# Patient Record
Sex: FEMALE | Race: WHITE | NOT HISPANIC OR LATINO | Employment: FULL TIME | URBAN - NONMETROPOLITAN AREA
[De-identification: names, ages, dates, MRNs, and addresses within clinical notes are randomized per-mention and may not be internally consistent; named-entity substitution may affect disease eponyms.]

---

## 2023-02-25 PROBLEM — L30.2 ID REACTION: Status: ACTIVE | Noted: 2023-02-25

## 2023-02-25 PROBLEM — L30.9 ECZEMA: Status: ACTIVE | Noted: 2023-02-25

## 2023-02-25 PROBLEM — R53.83 FATIGUE: Status: ACTIVE | Noted: 2023-02-25

## 2023-02-25 PROBLEM — R09.81 SINUS CONGESTION: Status: ACTIVE | Noted: 2023-02-25

## 2023-02-25 PROBLEM — F41.1 GENERALIZED ANXIETY DISORDER: Status: ACTIVE | Noted: 2023-02-25

## 2023-03-24 ENCOUNTER — OFFICE VISIT (OUTPATIENT)
Dept: PRIMARY CARE | Facility: CLINIC | Age: 28
End: 2023-03-24
Payer: COMMERCIAL

## 2023-03-24 VITALS
BODY MASS INDEX: 38.64 KG/M2 | TEMPERATURE: 97.6 F | DIASTOLIC BLOOD PRESSURE: 84 MMHG | OXYGEN SATURATION: 98 % | WEIGHT: 210 LBS | HEIGHT: 62 IN | HEART RATE: 75 BPM | SYSTOLIC BLOOD PRESSURE: 128 MMHG

## 2023-03-24 DIAGNOSIS — R10.11 COLICKY RIGHT UPPER QUADRANT PAIN: ICD-10-CM

## 2023-03-24 DIAGNOSIS — Z12.4 PAPANICOLAOU SMEAR FOR CERVICAL CANCER SCREENING: ICD-10-CM

## 2023-03-24 DIAGNOSIS — Z00.00 WELLNESS EXAMINATION: Primary | ICD-10-CM

## 2023-03-24 PROCEDURE — 87624 HPV HI-RISK TYP POOLED RSLT: CPT

## 2023-03-24 PROCEDURE — 99395 PREV VISIT EST AGE 18-39: CPT | Performed by: FAMILY MEDICINE

## 2023-03-24 PROCEDURE — 88175 CYTOPATH C/V AUTO FLUID REDO: CPT

## 2023-03-24 PROCEDURE — 1036F TOBACCO NON-USER: CPT | Performed by: FAMILY MEDICINE

## 2023-03-24 ASSESSMENT — PROMIS GLOBAL HEALTH SCALE
RATE_MENTAL_HEALTH: FAIR
CARRYOUT_SOCIAL_ACTIVITIES: VERY GOOD
CARRYOUT_PHYSICAL_ACTIVITIES: COMPLETELY
RATE_SOCIAL_SATISFACTION: VERY GOOD
RATE_AVERAGE_PAIN: 0
RATE_PHYSICAL_HEALTH: GOOD
RATE_GENERAL_HEALTH: GOOD
RATE_QUALITY_OF_LIFE: VERY GOOD
RATE_AVERAGE_FATIGUE: SEVERE
EMOTIONAL_PROBLEMS: SOMETIMES

## 2023-03-24 ASSESSMENT — ENCOUNTER SYMPTOMS
LOSS OF SENSATION IN FEET: 0
OCCASIONAL FEELINGS OF UNSTEADINESS: 0
DEPRESSION: 0

## 2023-03-24 ASSESSMENT — PATIENT HEALTH QUESTIONNAIRE - PHQ9
1. LITTLE INTEREST OR PLEASURE IN DOING THINGS: NOT AT ALL
2. FEELING DOWN, DEPRESSED OR HOPELESS: NOT AT ALL
SUM OF ALL RESPONSES TO PHQ9 QUESTIONS 1 AND 2: 0

## 2023-03-24 NOTE — PROGRESS NOTES
"Subjective   Patient ID: Alison Smith is a 27 y.o. female who presents for Gynecologic Exam (Vision screen: R20/50 L20/10 Bilaterally 20/10/LMP:3/6/23/No HX of abnormal PAP/No HX of pregnancy).    Gynecologic Exam     FEELS WELL MOST DAYS . LOTS OF STRESS LAST FEW MONTHS   JUST LOST HER SISTER WITH LEUKEMIA     Review of Systems   Gastrointestinal:         IBS ISSUES AND DIARRHEA AFTER EATING AND SOME RUQ PAIN    Genitourinary:  Negative for vaginal bleeding.   All other systems reviewed and are negative.      Objective   /84   Pulse 75   Temp 36.4 °C (97.6 °F)   Ht 1.581 m (5' 2.25\")   Wt 95.3 kg (210 lb)   LMP 03/06/2023 (Exact Date)   SpO2 98%   BMI 38.10 kg/m²     Physical Exam  Vitals reviewed.   Constitutional:       Appearance: Normal appearance.   HENT:      Head: Normocephalic.      Mouth/Throat:      Mouth: Mucous membranes are moist.   Eyes:      Pupils: Pupils are equal, round, and reactive to light.   Cardiovascular:      Rate and Rhythm: Normal rate and regular rhythm.      Pulses: Normal pulses.      Heart sounds: Normal heart sounds.   Pulmonary:      Effort: Pulmonary effort is normal.      Breath sounds: Normal breath sounds.   Abdominal:      General: Abdomen is flat.      Palpations: Abdomen is soft. There is no mass.      Tenderness: There is no right CVA tenderness or left CVA tenderness.   Musculoskeletal:         General: Normal range of motion.      Cervical back: Normal range of motion.   Skin:     General: Skin is warm and dry.   Neurological:      General: No focal deficit present.      Mental Status: She is alert.   Psychiatric:         Mood and Affect: Mood normal.         Assessment/Plan   Diagnoses and all orders for this visit:  Wellness examination  -     Follow Up In Primary Care; Future  Papanicolaou smear for cervical cancer screening  -     THINPREP PAP TEST  Colicky right upper quadrant pain  -     US gallbladder; Future         "

## 2023-03-30 LAB
COMPLETE PATHOLOGY REPORT: NORMAL
CONVERTED CLINICAL DIAGNOSIS-HISTORY: NORMAL
CONVERTED DIAGNOSIS COMMENT: NORMAL
CONVERTED FINAL DIAGNOSIS: NORMAL
CONVERTED FINAL REPORT PDF LINK TO COPY AND PASTE: NORMAL

## 2023-04-03 ENCOUNTER — TELEPHONE (OUTPATIENT)
Dept: PRIMARY CARE | Facility: CLINIC | Age: 28
End: 2023-04-03
Payer: COMMERCIAL

## 2023-04-10 NOTE — TELEPHONE ENCOUNTER
Liana Zabala, DO Dandre Deleon, APRN-CNP5 days ago     PL  She is HPV + Follow at least one year but her  Age makes we want to refer for colposcopy

## 2023-04-10 NOTE — TELEPHONE ENCOUNTER
Patient called and notified of the results below. I dont see any messages other than mine. Is she to wait one year for repeat pap or get scheduled now for colposcopy?

## 2023-04-10 NOTE — TELEPHONE ENCOUNTER
Liana Zabala, DO  [facility] now (4:39 PM)     PL  NORMAL PAP AND YEARLY EXAM TO FOLLOW HPV WHICH SHOULD CLEAR ON ITS OWN

## 2023-04-28 ENCOUNTER — TELEPHONE (OUTPATIENT)
Dept: PRIMARY CARE | Facility: CLINIC | Age: 28
End: 2023-04-28
Payer: COMMERCIAL

## 2023-04-28 NOTE — TELEPHONE ENCOUNTER
----- Message from Liana Zabala DO sent at 4/28/2023  9:11 AM EDT -----  YOUR GB STUDY IS REPORTED NORMAL/CONTINUE LOWER FAT AND MORE VEGETABLES   FOLLOW IF THIS IS STILL A PROBLEM

## 2023-04-28 NOTE — TELEPHONE ENCOUNTER
Spoke with Alison.  She is aware of both Gallbladder US and PAP.  Will follow if GB continues to be a problem, and will follow in 1 yr regarding her PAP.  CODY ROA

## 2023-05-30 LAB
ERYTHROCYTE DISTRIBUTION WIDTH (RATIO) BY AUTOMATED COUNT: 13.5 % (ref 11.5–14.5)
ERYTHROCYTE MEAN CORPUSCULAR HEMOGLOBIN CONCENTRATION (G/DL) BY AUTOMATED: 31.4 G/DL (ref 32–36)
ERYTHROCYTE MEAN CORPUSCULAR VOLUME (FL) BY AUTOMATED COUNT: 88 FL (ref 80–100)
ERYTHROCYTES (10*6/UL) IN BLOOD BY AUTOMATED COUNT: 4.47 X10E12/L (ref 4–5.2)
HEMATOCRIT (%) IN BLOOD BY AUTOMATED COUNT: 39.5 % (ref 36–46)
HEMOGLOBIN (G/DL) IN BLOOD: 12.4 G/DL (ref 12–16)
LEUKOCYTES (10*3/UL) IN BLOOD BY AUTOMATED COUNT: 10 X10E9/L (ref 4.4–11.3)
PLATELETS (10*3/UL) IN BLOOD AUTOMATED COUNT: 276 X10E9/L (ref 150–450)
REFLEX ADDED, ANEMIA PANEL: ABNORMAL

## 2023-05-31 LAB
ABO GROUP (TYPE) IN BLOOD: NORMAL
ANTIBODY SCREEN: NORMAL
HEPATITIS B VIRUS SURFACE AG PRESENCE IN SERUM: NONREACTIVE
HEPATITIS C VIRUS AB PRESENCE IN SERUM: NONREACTIVE
HIV 1/ 2 AG/AB SCREEN: NONREACTIVE
RH FACTOR: NORMAL
RUBELLA VIRUS IGG AB: POSITIVE
SYPHILIS TOTAL AB: NONREACTIVE

## 2023-06-17 LAB
CHLAMYDIA TRACH., AMPLIFIED: NEGATIVE
N. GONORRHEA, AMPLIFIED: NEGATIVE
URINE CULTURE: NORMAL

## 2023-06-24 LAB
ESTIMATED AVERAGE GLUCOSE FOR HBA1C: 108 MG/DL
HEMOGLOBIN A1C/HEMOGLOBIN TOTAL IN BLOOD: 5.4 %

## 2023-09-01 ENCOUNTER — HOSPITAL ENCOUNTER (OUTPATIENT)
Dept: DATA CONVERSION | Facility: HOSPITAL | Age: 28
End: 2023-09-01
Attending: OBSTETRICS & GYNECOLOGY
Payer: COMMERCIAL

## 2023-09-01 DIAGNOSIS — Z3A.20 20 WEEKS GESTATION OF PREGNANCY (HHS-HCC): ICD-10-CM

## 2023-09-01 DIAGNOSIS — O36.8120 DECREASED FETAL MOVEMENTS, SECOND TRIMESTER, NOT APPLICABLE OR UNSPECIFIED (HHS-HCC): ICD-10-CM

## 2023-09-26 VITALS — SYSTOLIC BLOOD PRESSURE: 128 MMHG | BODY MASS INDEX: 41.19 KG/M2 | WEIGHT: 218 LBS | DIASTOLIC BLOOD PRESSURE: 70 MMHG

## 2023-09-26 PROBLEM — O99.210 OBESITY IN PREGNANCY (HHS-HCC): Status: ACTIVE | Noted: 2023-09-26

## 2023-09-26 PROBLEM — O21.9 NAUSEA AND VOMITING IN PREGNANCY (HHS-HCC): Status: ACTIVE | Noted: 2023-09-26

## 2023-09-26 PROBLEM — O35.9XX0 SUSPECTED FETAL ANOMALY, ANTEPARTUM (HHS-HCC): Status: ACTIVE | Noted: 2023-09-26

## 2023-09-26 RX ORDER — METOCLOPRAMIDE 10 MG/1
TABLET ORAL EVERY 6 HOURS
COMMUNITY
Start: 2023-07-13 | End: 2023-12-26 | Stop reason: HOSPADM

## 2023-09-26 RX ORDER — NEOMYCIN/BACITRACIN/POLYMYXINB 3.5-400-5K
OINTMENT (GRAM) TOPICAL
COMMUNITY
End: 2023-12-26 | Stop reason: HOSPADM

## 2023-09-26 NOTE — PROGRESS NOTES
STILL HAVING NAUSEA, NOT AS BAD AS LAST TIME. YESTERDAY PT GOT SLIGHTLY DIZZY, SHE WAS SNACKING ALL DAY LIKE RECOMMENDED. WOULD LIKE FIANCE IN ROOM FOR ULTRASOUND. NO BLEEDING OR CRAMPING -Nevaeh Chaney RN 06/16/2023 11:41 AM    ENOB: Patient doing well. Denies bleeding, pain, n/v  ROS: Neg fever, chills, cp/sob, abdominal pain  Exam as above and exam tab  A/P:  Oriented to practice  Pap NIL/HPV+ earlier this week, NG/CT done today  Discussed genetic screening, patient consents to F[facility]ight/Prequel  Advised COVID, Flu, TDap during pregnancy  A1c ordered for BMI  PNV  Precautions given -Cynthia Delgado 06/16/2023 12:24 PM

## 2023-09-30 NOTE — DISCHARGE SUMMARY
Send Summary:   Discharge Summary Providers:  Provider Role Provider Name   · Attending Cynthia Delgado   · Referring Cynthia Delgado   · Primary Liana Zabala       Note Recipients: Liana ZabalaDO - [id number]  [Preferred]       Discharge:    Summary:   Admission Date: .01-Sep-2023 16:51:00   Discharge Date: 01-Sep-2023   Attending Physician at Discharge: Cynthia Delgado   Admission Reason: Decreased fetal movement   Final Discharge Diagnoses: Same   Procedures: None   Condition at Discharge: Satisfactory   Disposition at Discharge: .Home   Vital Signs:        T   P  R  BP   MAP  SpO2   Value     80     139/77   101     Date/Time   9/1 17:02   9/1 17:02  9/1 17:02    Range     (80 - 80 )    (139 - 139 )/ (77 - 77 )  (101 - 101 )    Hospital Course:    G1 presenting at 20 weeks with complaint of decreased fetal movement. During monitoring FHT was found to be in normal range and fluid normal with fetal movement noted  on BSUS. Patient was discharged home with precautions and plan for short interval follow up.      Discharge Information:    and Continuing Care:   Lab Results - Pending:    None  Radiology Results - Pending: None   [place] Suicide Risk: negative   Discharge Instructions:    Activity:           Return to normal activity as tolerated    Nutrition/Diet:           Regular    Follow Up Appointments:    Follow-Up - OB Provider:           Physician/Dept/Service:   OB Provider   GWS          Call to Schedule in:   2 weeks          Location:   [facility]          Phone Number:   [phone]    Discharge Medications: Home Medication   albuterol 90 mcg/inh inhalation aerosol - 2 puff(s) inhaled 4 times a day as needed  PNV Prenatal oral tablet - 1 tab(s) orally once a day  Unisom 25 mg oral tablet - 1 tab(s) orally once a day     PRN Medication   metoclopramide 10 mg oral tablet - 1 tab(s) orally 4 times a day (before meals and at bedtime), As Needed     DNR Status:   ·  Code  Status Code Status order at time of discharge: Full Code       Electronic Signatures:  Cynthia Delgado ()  (Signed 01-Sep-2023 19:20)   Authored: Send Summary, Summary Content, Ongoing Care,  DNR Status, Note Completion      Last Updated: 01-Sep-2023 19:20 by Cynthia Delgado (DO)

## 2023-09-30 NOTE — PROGRESS NOTES
Current Stage:   Stage: Antepartum     Subjective Data:   Antepartum:  Vaginal Bleeding: No   Contractions/Abdominal Pain: No   Discharge/Loss of Fluid: No   Fetal Movement: Decreased   Antepartum:    Patient is a  who presents at 20 weeks for complaint of decreased fetal movement. She denies pain and bleeding. She denies leaking fluid. She states she has started  feeling fetal movement in the last 1-2 weeks. She states that this week she has felt movement every day. Last night she attempted to listen to the fetal heartbeat with a home doppler and was unable to find the heartbeat. She states today she has not felt  any movement and she is worried.      Objective Information:    Objective Information:      T   P  R  BP   MAP  SpO2   Value     80     139/77   101     Date/Time    17:02    17:02   17:02    Range     (80 - 80 )    (139 - 139 )/ (77 - 77 )  (101 - 101 )        Physical Exam:   Constitutional: alert, oriented   Respiratory/Thorax: normal respiratory effort   Cardiovascular: Regular rate   Gastrointestinal: Soft, NT, gravid   Extremities: No edema   Psychological: Affect/mood appropriate   Skin: no rashes or lesions     Radiology Results:    Results:    BSUS: MVP 4.03 cm, otherwise grossly normal. +FCA/FM     Testing:   NST Interpretation - Baby A:  ·  Baseline      Assessment and Plan:   Assessment:     presenting for decreased fetal movement at 20 weeks  Discussed findings on exam  Reassured patient FHT is in the casi range, fluid is normal, and fetal movement was seen during US  Discussed that it is not unusual for patients to have irregular movement at 20 weeks  Patient has anatomy US next week and follow up in office on 23. Advised to keep both visits  Precautions given      Electronic Signatures:  Cynthia Delgado ()  (Signed 01-Sep-2023 19:15)   Authored: Current Stage, Subjective Data, Objective Data,   Testing, Assessment and Plan, Note  Completion      Last Updated: 01-Sep-2023 19:15 by Cynthia Delgado (DO)

## 2023-10-09 ENCOUNTER — ROUTINE PRENATAL (OUTPATIENT)
Dept: OBSTETRICS AND GYNECOLOGY | Facility: CLINIC | Age: 28
End: 2023-10-09
Payer: COMMERCIAL

## 2023-10-09 VITALS — DIASTOLIC BLOOD PRESSURE: 72 MMHG | SYSTOLIC BLOOD PRESSURE: 120 MMHG | BODY MASS INDEX: 41.95 KG/M2 | WEIGHT: 222 LBS

## 2023-10-09 DIAGNOSIS — Z13.79 GENETIC SCREENING: ICD-10-CM

## 2023-10-09 DIAGNOSIS — R10.30: ICD-10-CM

## 2023-10-09 DIAGNOSIS — O99.340 ANXIETY DURING PREGNANCY (HHS-HCC): ICD-10-CM

## 2023-10-09 DIAGNOSIS — F41.9 ANXIETY DURING PREGNANCY (HHS-HCC): ICD-10-CM

## 2023-10-09 DIAGNOSIS — O26.899: ICD-10-CM

## 2023-10-09 DIAGNOSIS — Z3A.26 26 WEEKS GESTATION OF PREGNANCY (HHS-HCC): Primary | ICD-10-CM

## 2023-10-09 DIAGNOSIS — O21.9 NAUSEA AND VOMITING IN PREGNANCY (HHS-HCC): ICD-10-CM

## 2023-10-09 DIAGNOSIS — Z88.0 PENICILLIN ALLERGY: ICD-10-CM

## 2023-10-09 LAB
POC APPEARANCE, URINE: CLEAR
POC BILIRUBIN, URINE: NEGATIVE
POC BLOOD, URINE: NEGATIVE
POC COLOR, URINE: YELLOW
POC GLUCOSE, URINE: NEGATIVE MG/DL
POC KETONES, URINE: NEGATIVE MG/DL
POC LEUKOCYTES, URINE: NEGATIVE
POC NITRITE,URINE: NEGATIVE
POC PH, URINE: 5 PH
POC PROTEIN, URINE: NEGATIVE MG/DL
POC SPECIFIC GRAVITY, URINE: <=1.005
POC UROBILINOGEN, URINE: 0.2 EU/DL

## 2023-10-09 PROCEDURE — 81003 URINALYSIS AUTO W/O SCOPE: CPT | Performed by: OBSTETRICS & GYNECOLOGY

## 2023-10-09 PROCEDURE — 0501F PRENATAL FLOW SHEET: CPT | Performed by: OBSTETRICS & GYNECOLOGY

## 2023-10-09 NOTE — PROGRESS NOTES
"Subjective   Patient ID [id number]   Alison Smith is a 28 y.o.  at 26w1d with a working estimated date of delivery of 2024, by Ultrasound who presents for a routine prenatal visit. She denies vaginal bleeding, leakage of fluid, decreased fetal movements, or contractions.    Endorses N/V, pelvic cramps. See problem list    Objective   Physical Exam  Weight: 101 kg (222 lb)  Expected Total Weight Gain: 5 kg (11 lb)-9 kg (19 lb)   Pregravid BMI: 40.27  BP: 120/72  Fetal Heart Rate: 147 Fundal Height (cm): 28 cm    Prenatal Labs  Urine dip:  Lab Results   Component Value Date    KETONESU NEGATIVE 10/09/2023       Lab Results   Component Value Date    HGB 12.4 2023    HCT 39.5 2023    HEPBSAG NONREACTIVE 2023     No results found for: \"PAPPA\", \"AFP\", \"HCG\", \"ESTRIOL\", \"INHBA\"  No results found for: \"GLUF\", \"GLUT1\", \"DSGVUFB2UF\", \"JGGRYRD4MV\"    Imaging  The most recent ultrasound was performed on The most recent ultrasound study is not finalized with a study GA of The most recent ultrasound study is not finalized and EFW of The most recent ultrasound study is not finalized.  The most recent ultrasound study is not finalized  The most recent ultrasound study is not finalized    Assessment/Plan   Problem List Items Addressed This Visit             ICD-10-CM    Nausea and vomiting in pregnancy O21.9    26 weeks gestation of pregnancy - Primary Z3A.26     CBC, GCT          Relevant Orders    POC Urine Dip (Completed)    Penicillin allergy Z88.0     hives         Anxiety during pregnancy O99.340, F41.9     No meds         Genetic screening Z13.79     Risk reducing NIPT 46XY         Pregnancy with abdominal cramping of lower quadrant, antepartum O26.899, R10.30     Recent pelvic cramping. Mild.   N/V and very thirsty. No UTI sx. No abnormal discharge.   SVE closed.             Follow up in 2 weeks for a routine prenatal visit.  "

## 2023-10-09 NOTE — ASSESSMENT & PLAN NOTE
Prepreg BMI 40. Early HbA1c 5.4%  For growth US at 30, 36 wks. 9/2023 EFW 61%. Posterior placenta. Anatomy US complete after second US.   For NST or BPP from 34 wks to delivery

## 2023-10-09 NOTE — ASSESSMENT & PLAN NOTE
Recent pelvic cramping. Mild.   N/V and very thirsty. No UTI sx. No abnormal discharge.   SVE closed.

## 2023-10-13 ENCOUNTER — LAB (OUTPATIENT)
Dept: LAB | Facility: LAB | Age: 28
End: 2023-10-13
Payer: COMMERCIAL

## 2023-10-13 DIAGNOSIS — Z34.90 ENCOUNTER FOR SUPERVISION OF NORMAL PREGNANCY, UNSPECIFIED, UNSPECIFIED TRIMESTER (HHS-HCC): Primary | ICD-10-CM

## 2023-10-13 LAB
ERYTHROCYTE [DISTWIDTH] IN BLOOD BY AUTOMATED COUNT: 13.4 % (ref 11.5–14.5)
GLUCOSE 1H P 50 G GLC PO SERPL-MCNC: 155 MG/DL
HCT VFR BLD AUTO: 35.2 % (ref 36–46)
HGB BLD-MCNC: 11.4 G/DL (ref 12–16)
MCH RBC QN AUTO: 28.6 PG (ref 26–34)
MCHC RBC AUTO-ENTMCNC: 32.4 G/DL (ref 32–36)
MCV RBC AUTO: 88 FL (ref 80–100)
NRBC BLD-RTO: 0 /100 WBCS (ref 0–0)
PLATELET # BLD AUTO: 236 X10*3/UL (ref 150–450)
PMV BLD AUTO: 10 FL (ref 7.5–11.5)
RBC # BLD AUTO: 3.98 X10*6/UL (ref 4–5.2)
REFLEX ADDED, ANEMIA PANEL: NORMAL
WBC # BLD AUTO: 11.4 X10*3/UL (ref 4.4–11.3)

## 2023-10-13 PROCEDURE — 85027 COMPLETE CBC AUTOMATED: CPT

## 2023-10-13 PROCEDURE — 82947 ASSAY GLUCOSE BLOOD QUANT: CPT

## 2023-10-13 PROCEDURE — 36415 COLL VENOUS BLD VENIPUNCTURE: CPT

## 2023-10-17 DIAGNOSIS — R73.09 GLUCOSE TOLERANCE TEST ABNORMAL: ICD-10-CM

## 2023-10-21 ENCOUNTER — LAB (OUTPATIENT)
Dept: LAB | Facility: LAB | Age: 28
End: 2023-10-21
Payer: COMMERCIAL

## 2023-10-21 DIAGNOSIS — R73.09 GLUCOSE TOLERANCE TEST ABNORMAL: ICD-10-CM

## 2023-10-21 LAB
GLUCOSE 1H P 100 G GLC PO SERPL-MCNC: 167 MG/DL
GLUCOSE 2H P 100 G GLC PO SERPL-MCNC: 115 MG/DL
GLUCOSE 3H P 100 G GLC PO SERPL-MCNC: 131 MG/DL
GLUCOSE P FAST SERPL-MCNC: 89 MG/DL

## 2023-10-21 PROCEDURE — 82951 GLUCOSE TOLERANCE TEST (GTT): CPT

## 2023-10-21 PROCEDURE — 36415 COLL VENOUS BLD VENIPUNCTURE: CPT

## 2023-10-21 PROCEDURE — 82950 GLUCOSE TEST: CPT

## 2023-10-21 PROCEDURE — 82952 GTT-ADDED SAMPLES: CPT

## 2023-10-23 ENCOUNTER — OFFICE VISIT (OUTPATIENT)
Dept: OBSTETRICS AND GYNECOLOGY | Facility: CLINIC | Age: 28
End: 2023-10-23
Payer: COMMERCIAL

## 2023-10-23 VITALS — WEIGHT: 223 LBS | DIASTOLIC BLOOD PRESSURE: 68 MMHG | BODY MASS INDEX: 42.14 KG/M2 | SYSTOLIC BLOOD PRESSURE: 112 MMHG

## 2023-10-23 DIAGNOSIS — O99.210 OBESITY IN PREGNANCY (HHS-HCC): ICD-10-CM

## 2023-10-23 DIAGNOSIS — Z34.03 ENCOUNTER FOR SUPERVISION OF NORMAL FIRST PREGNANCY IN THIRD TRIMESTER (HHS-HCC): Primary | ICD-10-CM

## 2023-10-23 LAB
POC APPEARANCE, URINE: CLEAR
POC BILIRUBIN, URINE: NEGATIVE
POC BLOOD, URINE: NEGATIVE
POC COLOR, URINE: YELLOW
POC GLUCOSE, URINE: NEGATIVE MG/DL
POC KETONES, URINE: NEGATIVE MG/DL
POC LEUKOCYTES, URINE: NEGATIVE
POC NITRITE,URINE: NEGATIVE
POC PH, URINE: 6.5 PH
POC PROTEIN, URINE: NEGATIVE MG/DL
POC SPECIFIC GRAVITY, URINE: <=1.005
POC UROBILINOGEN, URINE: 0.2 EU/DL

## 2023-10-23 PROCEDURE — 1036F TOBACCO NON-USER: CPT | Performed by: NURSE PRACTITIONER

## 2023-10-23 PROCEDURE — 81003 URINALYSIS AUTO W/O SCOPE: CPT | Performed by: NURSE PRACTITIONER

## 2023-10-23 PROCEDURE — 99213 OFFICE O/P EST LOW 20 MIN: CPT | Performed by: NURSE PRACTITIONER

## 2023-10-23 NOTE — PROGRESS NOTES
Alison Smith is a 28 y.o.  who presents at 28w1d with Estimated Date of Delivery: 24 for a routine prenatal visit.   Patient doing well. Baby is active.    Her pregnancy is complicated by:  Abnormal glucose tolerance: Failed 1-hr, passed 3-hr GTT.   Obesity in pregnancy: Prepreg BMI 40. Plan weekly NSTs at 34 weeks. Has growth US scheduled at 30 weeks.     Objective   Fetal Heart Rate: 140  Fundal Height (cm): 28 cm  Movement: Present  BP: 112/68  Weight  Weight: 101 kg (223 lb)  Urine Dipstick  Urine Protein: NEGATIVE  Urine Leukocytes: NEGATIVE  Urine Ketone: NEGATIVE  Urine Glucose: NEGATIVE      Assessment/Plan   Problem List Items Addressed This Visit             ICD-10-CM       Ob-Gyn Problems    Obesity in pregnancy O99.210     Other Visit Diagnoses         Codes    Encounter for supervision of normal first pregnancy in third trimester    -  Primary Z34.03    Relevant Orders    POC Urine Dip (Completed)          Continue prenatal vitamin.  Follow up in 2 weeks for a routine prenatal visit.  
Refused

## 2023-11-06 ENCOUNTER — APPOINTMENT (OUTPATIENT)
Dept: RADIOLOGY | Facility: CLINIC | Age: 28
End: 2023-11-06
Payer: COMMERCIAL

## 2023-11-06 ENCOUNTER — ANCILLARY PROCEDURE (OUTPATIENT)
Dept: RADIOLOGY | Facility: CLINIC | Age: 28
End: 2023-11-06
Payer: COMMERCIAL

## 2023-11-06 DIAGNOSIS — Z3A.30 30 WEEKS GESTATION OF PREGNANCY (HHS-HCC): ICD-10-CM

## 2023-11-06 PROCEDURE — 76816 OB US FOLLOW-UP PER FETUS: CPT

## 2023-11-06 PROCEDURE — 76819 FETAL BIOPHYS PROFIL W/O NST: CPT

## 2023-11-06 PROCEDURE — 76816 OB US FOLLOW-UP PER FETUS: CPT | Performed by: OBSTETRICS & GYNECOLOGY

## 2023-11-06 PROCEDURE — 76819 FETAL BIOPHYS PROFIL W/O NST: CPT | Performed by: OBSTETRICS & GYNECOLOGY

## 2023-11-07 ENCOUNTER — ROUTINE PRENATAL (OUTPATIENT)
Dept: OBSTETRICS AND GYNECOLOGY | Facility: CLINIC | Age: 28
End: 2023-11-07
Payer: COMMERCIAL

## 2023-11-07 VITALS — WEIGHT: 225 LBS | BODY MASS INDEX: 42.51 KG/M2 | SYSTOLIC BLOOD PRESSURE: 112 MMHG | DIASTOLIC BLOOD PRESSURE: 68 MMHG

## 2023-11-07 DIAGNOSIS — Z34.93 THIRD TRIMESTER PREGNANCY (HHS-HCC): Primary | ICD-10-CM

## 2023-11-07 LAB
POC APPEARANCE, URINE: CLEAR
POC BILIRUBIN, URINE: NEGATIVE
POC BLOOD, URINE: NEGATIVE
POC COLOR, URINE: YELLOW
POC GLUCOSE, URINE: NEGATIVE MG/DL
POC KETONES, URINE: NEGATIVE MG/DL
POC LEUKOCYTES, URINE: NEGATIVE
POC NITRITE,URINE: NEGATIVE
POC PH, URINE: 7 PH
POC PROTEIN, URINE: NEGATIVE MG/DL
POC SPECIFIC GRAVITY, URINE: 1.01
POC UROBILINOGEN, URINE: 0.2 EU/DL

## 2023-11-07 PROCEDURE — 81003 URINALYSIS AUTO W/O SCOPE: CPT | Performed by: OBSTETRICS & GYNECOLOGY

## 2023-11-07 PROCEDURE — 0501F PRENATAL FLOW SHEET: CPT | Performed by: OBSTETRICS & GYNECOLOGY

## 2023-11-07 NOTE — PATIENT INSTRUCTIONS
You were seen in the office today for routine OB care and had normal findings on exam  Continue routine OB precautions at home  Continue taking prenatal vitamins   Avoid sick contacts and consider getting your Flu (available in office during flu season) and COVID vaccines to protect against infection in pregnancy  We recommend getting the Tdap (available in office) and RSV vaccines to pass some immunity from mother to baby and protect your baby against RSV and Whooping cough in the first few months of life. Tdap can be given between 27 and 36 weeks, and RSV vaccinations can be given between 32 and 36 weeks gestation  Make an appointment for routine care in the office in the next 2 weeks  If you are having any painful contractions, vaginal bleeding, leaking amniotic fluid, or decrease in baby's movements prior to your next visit please call the office to speak to the physician on call. This includes after hours, weekends, and holidays, when the answering service will be able to connect you with the physician on call. [phone] ([place] Office) or [phone] ([place] Office).

## 2023-11-07 NOTE — PROGRESS NOTES
Subjective   Patient ID [id number]   Alison Smith is a 28 y.o.  at 30w2d with a working estimated date of delivery of 2024, by Ultrasound who presents for a routine prenatal visit. She denies vaginal bleeding, leakage of fluid, decreased fetal movements, or contractions.    Her pregnancy is complicated by:  Failed 1 hour GTT, passed 3 hour GTT  Obesity  PCN allergy    Objective   Physical Exam:   Weight: 102 kg (225 lb)  Expected Total Weight Gain: 5 kg (11 lb)-9 kg (19 lb)   Pregravid BMI: 40.27  BP: 112/68                  Prenatal Labs  Urine Dip:  Lab Results   Component Value Date    KETONESU NEGATIVE 10/23/2023     Lab Results   Component Value Date    HGB 11.4 (L) 10/13/2023    HCT 35.2 (L) 10/13/2023    ABO A 2023    HEPBSAG NONREACTIVE 2023       Imagin g (53%) normal interval growth. BPP     Assessment/Plan   Problem List Items Addressed This Visit    None  Visit Diagnoses         Codes    Third trimester pregnancy    -  Primary Z34.93    Patient doing well  Second trimester labs reviewed/normal  Encouraged TDap/RSV  RTO 2 weeks     Relevant Orders    POC Urine Dip (Completed)          Continue prenatal vitamin.  Labs reviewed.  GBS at 36  Expected mode of delivery TBD  Follow up in 2 week for a routine prenatal visit.  Cynthia Delgado DO

## 2023-11-20 ENCOUNTER — ROUTINE PRENATAL (OUTPATIENT)
Dept: OBSTETRICS AND GYNECOLOGY | Facility: CLINIC | Age: 28
End: 2023-11-20
Payer: COMMERCIAL

## 2023-11-20 VITALS — WEIGHT: 226 LBS | DIASTOLIC BLOOD PRESSURE: 70 MMHG | SYSTOLIC BLOOD PRESSURE: 118 MMHG | BODY MASS INDEX: 42.7 KG/M2

## 2023-11-20 DIAGNOSIS — Z34.03 PRENATAL CARE, FIRST PREGNANCY IN THIRD TRIMESTER (HHS-HCC): Primary | ICD-10-CM

## 2023-11-20 DIAGNOSIS — Z23 NEED FOR DTP VACCINE: ICD-10-CM

## 2023-11-20 DIAGNOSIS — Z3A.32 32 WEEKS GESTATION OF PREGNANCY (HHS-HCC): ICD-10-CM

## 2023-11-20 LAB
POC GLUCOSE, URINE: NEGATIVE MG/DL
POC PROTEIN, URINE: ABNORMAL MG/DL

## 2023-11-20 PROCEDURE — 0501F PRENATAL FLOW SHEET: CPT | Performed by: OBSTETRICS & GYNECOLOGY

## 2023-11-20 PROCEDURE — 90715 TDAP VACCINE 7 YRS/> IM: CPT | Performed by: OBSTETRICS & GYNECOLOGY

## 2023-11-20 PROCEDURE — 90471 IMMUNIZATION ADMIN: CPT | Performed by: OBSTETRICS & GYNECOLOGY

## 2023-11-20 PROCEDURE — 81003 URINALYSIS AUTO W/O SCOPE: CPT | Performed by: OBSTETRICS & GYNECOLOGY

## 2023-11-20 NOTE — PROGRESS NOTES
Prenatal Visit    Patient presents for routine prenatal visit.   Feeling well.  Baby is moving. No abdominal pain. No bleeding. No leaking fluid.    Objective   Physical Exam:   Weight: 103 kg (226 lb)  Expected Total Weight Gain: 5 kg (11 lb)-9 kg (19 lb)   Pregravid BMI: 40.27  BP: 118/70  Fetal Heart Rate: 140 Fundal Height (cm): 33 cm             Assessment/Plan   1. Prenatal care, first pregnancy in third trimester    2. 32 weeks gestation of pregnancy  - POC Urine Dip  - Tdap vaccine, age 7 years and older (ADACEL)    3. Need for DTP vaccine [Z23]    Continue prenatal vitamins  Precautions given. Advised her to call for any concerns.  Tdap vaccine recommended; she will get this today.  Start weekly NSTs at her next visit.  Follow up in 2 weeks.

## 2023-11-22 ENCOUNTER — TELEPHONE (OUTPATIENT)
Dept: OBSTETRICS AND GYNECOLOGY | Facility: CLINIC | Age: 28
End: 2023-11-22
Payer: COMMERCIAL

## 2023-11-22 NOTE — TELEPHONE ENCOUNTER
11/22/23  PT HAS AN NST COMING UP ON 12/11/23 W/DR. JAMES. PT IS 32.7 WKS AND HAS HAD CRAMPS FOR THE PAST 2 DAYS, LIKE A PERIOD, MAINLY ON THE RIGHT SIDE. PT WOULD LIKE A CALL BACK.     PT'S# [phone]    PP

## 2023-12-04 ENCOUNTER — PROCEDURE VISIT (OUTPATIENT)
Dept: OBSTETRICS AND GYNECOLOGY | Facility: CLINIC | Age: 28
End: 2023-12-04
Payer: COMMERCIAL

## 2023-12-04 ENCOUNTER — APPOINTMENT (OUTPATIENT)
Dept: OBSTETRICS AND GYNECOLOGY | Facility: CLINIC | Age: 28
End: 2023-12-04
Payer: COMMERCIAL

## 2023-12-04 VITALS — WEIGHT: 233 LBS | SYSTOLIC BLOOD PRESSURE: 110 MMHG | BODY MASS INDEX: 44.02 KG/M2 | DIASTOLIC BLOOD PRESSURE: 70 MMHG

## 2023-12-04 DIAGNOSIS — O99.210 OBESITY IN PREGNANCY (HHS-HCC): ICD-10-CM

## 2023-12-04 DIAGNOSIS — Z3A.34 34 WEEKS GESTATION OF PREGNANCY (HHS-HCC): Primary | ICD-10-CM

## 2023-12-04 PROCEDURE — 81003 URINALYSIS AUTO W/O SCOPE: CPT | Performed by: NURSE PRACTITIONER

## 2023-12-04 PROCEDURE — 59025 FETAL NON-STRESS TEST: CPT | Performed by: NURSE PRACTITIONER

## 2023-12-04 PROCEDURE — 99213 OFFICE O/P EST LOW 20 MIN: CPT | Performed by: NURSE PRACTITIONER

## 2023-12-04 RX ORDER — FERROUS SULFATE 325(65) MG
325 TABLET ORAL 2 TIMES WEEKLY
COMMUNITY

## 2023-12-04 NOTE — PROGRESS NOTES
Alison Smith is a 28 y.o.  who presents at 34w1d with Estimated Date of Delivery: 24 for a routine prenatal visit.   She is feeling well. Baby is active.  Labor plans discussed, considering epidural. Has not selected pediatrician yet, list provided. Planning on pumping and feeding.     Her pregnancy is complicated by:  Obesity: Pregravid BMI 40.27. Weekly NSTs. Growth US scheduled at 36 weeks.     Objective   Fetal Heart Rate: 140  Movement: Present  BP: 110/70  Weight  Weight: 106 kg (233 lb)  Urine Dipstick  Urine Protein: NEGATIVE  Urine Leukocytes: NEGATIVE  Urine Ketone: NEGATIVE  Urine Glucose: NEGATIVE     NST reactive, category 1     Assessment/Plan   Diagnoses and all orders for this visit:  34 weeks gestation of pregnancy  -     POC Urine Dip  Obesity in pregnancy  -     Fetal nonstress test; Future  Weekly NSTs  Growth US 36 weeks  Precautions given  Follow up 1 week, sooner if needed

## 2023-12-11 ENCOUNTER — PROCEDURE VISIT (OUTPATIENT)
Dept: OBSTETRICS AND GYNECOLOGY | Facility: CLINIC | Age: 28
End: 2023-12-11
Payer: COMMERCIAL

## 2023-12-11 ENCOUNTER — LAB (OUTPATIENT)
Dept: LAB | Facility: LAB | Age: 28
End: 2023-12-11
Payer: COMMERCIAL

## 2023-12-11 VITALS — DIASTOLIC BLOOD PRESSURE: 72 MMHG | SYSTOLIC BLOOD PRESSURE: 138 MMHG | BODY MASS INDEX: 44.02 KG/M2 | WEIGHT: 233 LBS

## 2023-12-11 DIAGNOSIS — O99.210 OBESITY IN PREGNANCY (HHS-HCC): ICD-10-CM

## 2023-12-11 DIAGNOSIS — O16.9 ELEVATED BLOOD PRESSURE AFFECTING PREGNANCY, ANTEPARTUM (HHS-HCC): ICD-10-CM

## 2023-12-11 DIAGNOSIS — Z3A.35 35 WEEKS GESTATION OF PREGNANCY (HHS-HCC): ICD-10-CM

## 2023-12-11 DIAGNOSIS — Z34.03 PRENATAL CARE, FIRST PREGNANCY IN THIRD TRIMESTER (HHS-HCC): Primary | ICD-10-CM

## 2023-12-11 LAB
ALT SERPL W P-5'-P-CCNC: 9 U/L (ref 7–45)
AST SERPL W P-5'-P-CCNC: 12 U/L (ref 9–39)
CREAT SERPL-MCNC: 0.55 MG/DL (ref 0.5–1.05)
ERYTHROCYTE [DISTWIDTH] IN BLOOD BY AUTOMATED COUNT: 13.4 % (ref 11.5–14.5)
GFR SERPL CREATININE-BSD FRML MDRD: >90 ML/MIN/1.73M*2
HCT VFR BLD AUTO: 34.5 % (ref 36–46)
HGB BLD-MCNC: 11.2 G/DL (ref 12–16)
LDH SERPL L TO P-CCNC: 140 U/L (ref 84–246)
MCH RBC QN AUTO: 28.1 PG (ref 26–34)
MCHC RBC AUTO-ENTMCNC: 32.5 G/DL (ref 32–36)
MCV RBC AUTO: 87 FL (ref 80–100)
NRBC BLD-RTO: 0 /100 WBCS (ref 0–0)
PLATELET # BLD AUTO: 254 X10*3/UL (ref 150–450)
POC GLUCOSE, URINE: NEGATIVE MG/DL
POC PROTEIN, URINE: ABNORMAL MG/DL
RBC # BLD AUTO: 3.98 X10*6/UL (ref 4–5.2)
URATE SERPL-MCNC: 2.2 MG/DL (ref 2.3–6.7)
WBC # BLD AUTO: 12.2 X10*3/UL (ref 4.4–11.3)

## 2023-12-11 PROCEDURE — 99213 OFFICE O/P EST LOW 20 MIN: CPT | Performed by: OBSTETRICS & GYNECOLOGY

## 2023-12-11 PROCEDURE — 84156 ASSAY OF PROTEIN URINE: CPT

## 2023-12-11 PROCEDURE — 84450 TRANSFERASE (AST) (SGOT): CPT

## 2023-12-11 PROCEDURE — 81003 URINALYSIS AUTO W/O SCOPE: CPT | Performed by: OBSTETRICS & GYNECOLOGY

## 2023-12-11 PROCEDURE — 83615 LACTATE (LD) (LDH) ENZYME: CPT

## 2023-12-11 PROCEDURE — 84460 ALANINE AMINO (ALT) (SGPT): CPT

## 2023-12-11 PROCEDURE — 82570 ASSAY OF URINE CREATININE: CPT

## 2023-12-11 PROCEDURE — 59025 FETAL NON-STRESS TEST: CPT | Performed by: OBSTETRICS & GYNECOLOGY

## 2023-12-11 PROCEDURE — 84550 ASSAY OF BLOOD/URIC ACID: CPT

## 2023-12-11 PROCEDURE — 82565 ASSAY OF CREATININE: CPT

## 2023-12-11 PROCEDURE — 85027 COMPLETE CBC AUTOMATED: CPT

## 2023-12-11 PROCEDURE — 36415 COLL VENOUS BLD VENIPUNCTURE: CPT

## 2023-12-11 NOTE — PROGRESS NOTES
Prenatal Visit    Patient presents for routine prenatal visit and NST.  Feeling well overall.  Baby is moving, maybe a little less than usual. No abdominal pain. No bleeding. No leaking fluid.  No headache or vision changes.  Some increased swelling in feet and hands.    Objective   Physical Exam:   Weight: 106 kg (233 lb)  Expected Total Weight Gain: 5 kg (11 lb)-9 kg (19 lb)   Pregravid BMI: 40.27  BP: 138/72(repeat); first /78  Fetal Heart Rate: 140 Fundal Height (cm): 36 cm           NST: Category 1, reactive    Assessment/Plan   1. Prenatal care, first pregnancy in third trimester  2. 35 weeks gestation of pregnancy  - POC Urine Dip  3. Obesity in pregnancy  - Fetal nonstress test  4. Elevated blood pressure affecting pregnancy, antepartum  - Alanine Aminotransferase; Future  - Aspartate Aminotransferase; Future  - CBC; Future  - Creatinine; Future  - Uric Acid; Future  - Protein, Urine Random  - Lactate Dehydrogenase; Future    NST done today. Reactive.  Initial BP mild range, first time elevated this pregnancy. She has no severe symptoms. Repeat BP normal.  Will order HELLP labs, p/c ratio.  Patient advised on signs/symptoms to watch for. Gave information and log to start tracking BP at home.   I would like her to return to the office in 2-3 days for a repeat BP check.

## 2023-12-12 ENCOUNTER — ROUTINE PRENATAL (OUTPATIENT)
Dept: OBSTETRICS AND GYNECOLOGY | Facility: CLINIC | Age: 28
End: 2023-12-12
Payer: COMMERCIAL

## 2023-12-12 VITALS — SYSTOLIC BLOOD PRESSURE: 122 MMHG | WEIGHT: 231 LBS | DIASTOLIC BLOOD PRESSURE: 62 MMHG | BODY MASS INDEX: 43.65 KG/M2

## 2023-12-12 DIAGNOSIS — O21.9 NAUSEA AND VOMITING IN PREGNANCY (HHS-HCC): ICD-10-CM

## 2023-12-12 DIAGNOSIS — O13.3 GESTATIONAL HYPERTENSION, THIRD TRIMESTER (HHS-HCC): Primary | ICD-10-CM

## 2023-12-12 DIAGNOSIS — Z3A.35 35 WEEKS GESTATION OF PREGNANCY (HHS-HCC): ICD-10-CM

## 2023-12-12 LAB
CREAT UR-MCNC: 63.1 MG/DL (ref 20–320)
POC APPEARANCE, URINE: CLEAR
POC BILIRUBIN, URINE: NEGATIVE
POC BLOOD, URINE: NEGATIVE
POC COLOR, URINE: YELLOW
POC GLUCOSE, URINE: NEGATIVE MG/DL
POC KETONES, URINE: NEGATIVE MG/DL
POC LEUKOCYTES, URINE: NEGATIVE
POC NITRITE,URINE: NEGATIVE
POC PH, URINE: 7 PH
POC PROTEIN, URINE: NEGATIVE MG/DL
POC SPECIFIC GRAVITY, URINE: 1.01
POC UROBILINOGEN, URINE: 0.2 EU/DL
PROT UR-ACNC: 12 MG/DL (ref 5–24)
PROT/CREAT UR: 0.19 MG/MG CREAT (ref 0–0.17)

## 2023-12-12 PROCEDURE — 0501F PRENATAL FLOW SHEET: CPT | Performed by: NURSE PRACTITIONER

## 2023-12-12 PROCEDURE — 81003 URINALYSIS AUTO W/O SCOPE: CPT | Performed by: NURSE PRACTITIONER

## 2023-12-12 NOTE — PROGRESS NOTES
HPI    GFM - HEADACHES AND VOMITING - BLOOD PRESSURE WAS HIGH YESTERDAY IN OFFICE. AT HOME READINGS LAST NIGHT 151/83 AND THIS MORNING 137/83. VOMITING TODAY IS NOT ABLE TO KEEP ANYTHING DOWN TODAY NOT EVEN WATER.  Last edited by JAVIER Wallace on 2023  1:44 PM.         Alison Smith is a 28 y.o.  who presents at 35w2d with complaints of vomiting, and evaluation of blood pressures.     Seen in office with Dr. Morillo yesterday for routine visit/NST. Initial BP was 144/78, repeat normal 138/72. She went for PEC labs, UPCr. Labs were normal, UPCr 0.19.   BP log sheets given. She checked her BP at home last night and it was 151/83. This morning it was 137/83.  She has had nausea and vomiting the whole pregnancy. Has been taking Reglan as needed. This morning, she has had worsening symptoms. Not able to keep fluids down. Has not taken her Reglan today. She has had intermittent acid reflux as well.   Baby is active.   Denies vaginal bleeding, headache, vision changes, abdominal pain, chest pain, SOB.   BP is normotensive in office today.     Her pregnancy is also complicated by:  Obesity: Pregravid BMI is 40. She had NST in office yesterday. Growth US scheduled for next week.    Objective   Fetal Heart Rate: 150  Movement: Present  BP: 122/62  Weight  Weight: 105 kg (231 lb)  Urine Dipstick  Urine Protein: NEGATIVE  Urine Leukocytes: NEGATIVE  Urine Ketone: NEGATIVE  Urine Glucose: NEGATIVE      Assessment/Plan   Diagnoses and all orders for this visit:  Gestational hypertension, third trimester   -Based on elevated BP readings in office on  as well as at home.    -Normal PEC labs, UPCr on    -Growth US scheduled for next week   -To return for twice weekly NSTs, will schedule for later this week.   -Precautions given.   Nausea and vomiting in pregnancy    -Advised to take Reglan today as prescribed. Add daily Pepcid for reflux.   -Follow up if unable to maintain hydration and is  not keeping fluids down for >24 hours.

## 2023-12-15 ENCOUNTER — PROCEDURE VISIT (OUTPATIENT)
Dept: OBSTETRICS AND GYNECOLOGY | Facility: CLINIC | Age: 28
End: 2023-12-15
Payer: COMMERCIAL

## 2023-12-15 ENCOUNTER — APPOINTMENT (OUTPATIENT)
Dept: OBSTETRICS AND GYNECOLOGY | Facility: CLINIC | Age: 28
End: 2023-12-15
Payer: COMMERCIAL

## 2023-12-15 VITALS — WEIGHT: 235 LBS | BODY MASS INDEX: 44.4 KG/M2 | DIASTOLIC BLOOD PRESSURE: 64 MMHG | SYSTOLIC BLOOD PRESSURE: 146 MMHG

## 2023-12-15 DIAGNOSIS — O13.3 GESTATIONAL HYPERTENSION, THIRD TRIMESTER (HHS-HCC): Primary | ICD-10-CM

## 2023-12-15 DIAGNOSIS — Z3A.35 35 WEEKS GESTATION OF PREGNANCY (HHS-HCC): ICD-10-CM

## 2023-12-15 PROCEDURE — 59025 FETAL NON-STRESS TEST: CPT | Performed by: NURSE PRACTITIONER

## 2023-12-15 PROCEDURE — 99213 OFFICE O/P EST LOW 20 MIN: CPT | Performed by: NURSE PRACTITIONER

## 2023-12-15 PROCEDURE — 81003 URINALYSIS AUTO W/O SCOPE: CPT | Performed by: NURSE PRACTITIONER

## 2023-12-15 NOTE — PROGRESS NOTES
Alison Smith is a 28 y.o.  who presents at 35w5d with Estimated Date of Delivery: 24 for a prenatal visit.   Ruled in for GHTN: Labs, UPCr this week on  were normal, UPCr 0.19.  Monitoring BP's at home. Normal to mild range.  For twice weekly NSTs. She has growth US scheduled on .   GBS/US consent next visit.     Objective   Fetal Heart Rate: 140  Movement: Present  BP: 146/64  Weight  Weight: 107 kg (235 lb)  Urine Dipstick  Urine Protein: NEGATIVE  Urine Leukocytes: NEGATIVE  Urine Ketone: NEGATIVE  Urine Glucose: NEGATIVE     NST reactive, category 1     Assessment/Plan   Diagnoses and all orders for this visit:  Gestational hypertension, third trimester  35 weeks gestation of pregnancy  -Reactive NST.  -Twice weekly  testing.  -Repeat PEC labs, UPCr weekly.  -Growth US on   -Precautions and warning s/sxs to report reviewed.   -Call office for any severe range Bps.

## 2023-12-18 ENCOUNTER — ANCILLARY PROCEDURE (OUTPATIENT)
Dept: RADIOLOGY | Facility: CLINIC | Age: 28
End: 2023-12-18
Payer: COMMERCIAL

## 2023-12-18 DIAGNOSIS — Z3A.30 30 WEEKS GESTATION OF PREGNANCY (HHS-HCC): ICD-10-CM

## 2023-12-18 DIAGNOSIS — O13.3 GESTATIONAL HYPERTENSION W/O SIGNIFICANT PROTEINURIA IN 3RD TRIMESTER (HHS-HCC): ICD-10-CM

## 2023-12-18 DIAGNOSIS — O99.213 OBESITY AFFECTING PREGNANCY IN THIRD TRIMESTER (HHS-HCC): ICD-10-CM

## 2023-12-18 PROCEDURE — 76819 FETAL BIOPHYS PROFIL W/O NST: CPT | Performed by: OBSTETRICS & GYNECOLOGY

## 2023-12-18 PROCEDURE — 76819 FETAL BIOPHYS PROFIL W/O NST: CPT

## 2023-12-18 PROCEDURE — 76816 OB US FOLLOW-UP PER FETUS: CPT

## 2023-12-18 PROCEDURE — 76816 OB US FOLLOW-UP PER FETUS: CPT | Performed by: OBSTETRICS & GYNECOLOGY

## 2023-12-18 NOTE — PROGRESS NOTES
Subjective   Patient ID [id number]   Alison Smith is a 28 y.o.  at 36w2d , +FM, no ctxs/LOF/VB.    Ruled in for GHTN: Labs, UPCr this week on  were normal, UPCr 0.19.  Monitoring BP's at home. Mostly normal.  For twice weekly NSTs. She had ultrasound yesterday. Was vtx.    Objective   Physical Exam  Weight: 107 kg (235 lb)  BP: 138/70  Fetal Heart Rate: 140 Fundal Height (cm): 37 cm    Lab Results   Component Value Date    HGB 11.2 (L) 2023    HCT 34.5 (L) 2023       Assessment/Plan   GBS and consent.  Induction scheduled for 23 at 9 pm.  Repeat labs today.

## 2023-12-19 ENCOUNTER — PROCEDURE VISIT (OUTPATIENT)
Dept: OBSTETRICS AND GYNECOLOGY | Facility: CLINIC | Age: 28
End: 2023-12-19
Payer: COMMERCIAL

## 2023-12-19 ENCOUNTER — LAB (OUTPATIENT)
Dept: LAB | Facility: LAB | Age: 28
End: 2023-12-19
Payer: COMMERCIAL

## 2023-12-19 ENCOUNTER — APPOINTMENT (OUTPATIENT)
Dept: OBSTETRICS AND GYNECOLOGY | Facility: CLINIC | Age: 28
End: 2023-12-19
Payer: COMMERCIAL

## 2023-12-19 VITALS — DIASTOLIC BLOOD PRESSURE: 70 MMHG | SYSTOLIC BLOOD PRESSURE: 138 MMHG | BODY MASS INDEX: 44.4 KG/M2 | WEIGHT: 235 LBS

## 2023-12-19 DIAGNOSIS — Z3A.26 26 WEEKS GESTATION OF PREGNANCY (HHS-HCC): ICD-10-CM

## 2023-12-19 DIAGNOSIS — O13.3 GESTATIONAL HYPERTENSION, THIRD TRIMESTER (HHS-HCC): ICD-10-CM

## 2023-12-19 DIAGNOSIS — O99.340 ANXIETY DURING PREGNANCY (HHS-HCC): ICD-10-CM

## 2023-12-19 DIAGNOSIS — F41.9 ANXIETY DURING PREGNANCY (HHS-HCC): ICD-10-CM

## 2023-12-19 DIAGNOSIS — R10.30: ICD-10-CM

## 2023-12-19 DIAGNOSIS — O26.899: ICD-10-CM

## 2023-12-19 DIAGNOSIS — Z3A.36 36 WEEKS GESTATION OF PREGNANCY (HHS-HCC): ICD-10-CM

## 2023-12-19 LAB
ERYTHROCYTE [DISTWIDTH] IN BLOOD BY AUTOMATED COUNT: 13.8 % (ref 11.5–14.5)
HCT VFR BLD AUTO: 34.6 % (ref 36–46)
HGB BLD-MCNC: 10.6 G/DL (ref 12–16)
MCH RBC QN AUTO: 27.4 PG (ref 26–34)
MCHC RBC AUTO-ENTMCNC: 30.6 G/DL (ref 32–36)
MCV RBC AUTO: 89 FL (ref 80–100)
NRBC BLD-RTO: 0 /100 WBCS (ref 0–0)
PLATELET # BLD AUTO: 250 X10*3/UL (ref 150–450)
POC GLUCOSE, URINE: NEGATIVE MG/DL
POC PROTEIN, URINE: ABNORMAL MG/DL
RBC # BLD AUTO: 3.87 X10*6/UL (ref 4–5.2)
WBC # BLD AUTO: 11.4 X10*3/UL (ref 4.4–11.3)

## 2023-12-19 PROCEDURE — 84550 ASSAY OF BLOOD/URIC ACID: CPT

## 2023-12-19 PROCEDURE — 99213 OFFICE O/P EST LOW 20 MIN: CPT | Performed by: OBSTETRICS & GYNECOLOGY

## 2023-12-19 PROCEDURE — 87081 CULTURE SCREEN ONLY: CPT

## 2023-12-19 PROCEDURE — 85027 COMPLETE CBC AUTOMATED: CPT

## 2023-12-19 PROCEDURE — 84460 ALANINE AMINO (ALT) (SGPT): CPT

## 2023-12-19 PROCEDURE — 81003 URINALYSIS AUTO W/O SCOPE: CPT | Performed by: OBSTETRICS & GYNECOLOGY

## 2023-12-19 PROCEDURE — 83615 LACTATE (LD) (LDH) ENZYME: CPT

## 2023-12-19 PROCEDURE — 82565 ASSAY OF CREATININE: CPT

## 2023-12-19 PROCEDURE — 59025 FETAL NON-STRESS TEST: CPT | Performed by: OBSTETRICS & GYNECOLOGY

## 2023-12-19 PROCEDURE — 82570 ASSAY OF URINE CREATININE: CPT

## 2023-12-19 PROCEDURE — 84156 ASSAY OF PROTEIN URINE: CPT

## 2023-12-19 PROCEDURE — 36415 COLL VENOUS BLD VENIPUNCTURE: CPT

## 2023-12-19 PROCEDURE — 84450 TRANSFERASE (AST) (SGOT): CPT

## 2023-12-20 LAB
ALT SERPL W P-5'-P-CCNC: 9 U/L (ref 7–45)
AST SERPL W P-5'-P-CCNC: 13 U/L (ref 9–39)
CREAT SERPL-MCNC: 0.55 MG/DL (ref 0.5–1.05)
CREAT UR-MCNC: 53.8 MG/DL (ref 20–320)
GFR SERPL CREATININE-BSD FRML MDRD: >90 ML/MIN/1.73M*2
LDH SERPL L TO P-CCNC: 139 U/L (ref 84–246)
PROT UR-ACNC: 7 MG/DL (ref 5–24)
PROT/CREAT UR: 0.13 MG/MG CREAT (ref 0–0.17)
URATE SERPL-MCNC: 2.6 MG/DL (ref 2.3–6.7)

## 2023-12-22 ENCOUNTER — PREP FOR PROCEDURE (OUTPATIENT)
Dept: OBSTETRICS AND GYNECOLOGY | Facility: CLINIC | Age: 28
End: 2023-12-22

## 2023-12-22 ENCOUNTER — PROCEDURE VISIT (OUTPATIENT)
Dept: OBSTETRICS AND GYNECOLOGY | Facility: CLINIC | Age: 28
End: 2023-12-22
Payer: COMMERCIAL

## 2023-12-22 VITALS — DIASTOLIC BLOOD PRESSURE: 70 MMHG | SYSTOLIC BLOOD PRESSURE: 140 MMHG | WEIGHT: 234 LBS | BODY MASS INDEX: 44.21 KG/M2

## 2023-12-22 DIAGNOSIS — O13.3 GESTATIONAL HYPERTENSION, THIRD TRIMESTER (HHS-HCC): Primary | ICD-10-CM

## 2023-12-22 DIAGNOSIS — O99.210 OBESITY IN PREGNANCY (HHS-HCC): ICD-10-CM

## 2023-12-22 DIAGNOSIS — Z3A.36 36 WEEKS GESTATION OF PREGNANCY (HHS-HCC): ICD-10-CM

## 2023-12-22 LAB
POC GLUCOSE, URINE: NEGATIVE MG/DL
POC PROTEIN, URINE: ABNORMAL MG/DL

## 2023-12-22 PROCEDURE — 99213 OFFICE O/P EST LOW 20 MIN: CPT | Performed by: OBSTETRICS & GYNECOLOGY

## 2023-12-22 PROCEDURE — 81003 URINALYSIS AUTO W/O SCOPE: CPT | Performed by: OBSTETRICS & GYNECOLOGY

## 2023-12-22 PROCEDURE — 59025 FETAL NON-STRESS TEST: CPT | Performed by: OBSTETRICS & GYNECOLOGY

## 2023-12-22 RX ORDER — ONDANSETRON HYDROCHLORIDE 2 MG/ML
4 INJECTION, SOLUTION INTRAVENOUS EVERY 6 HOURS PRN
Status: CANCELLED | OUTPATIENT
Start: 2023-12-22

## 2023-12-22 RX ORDER — MISOPROSTOL 200 UG/1
800 TABLET ORAL ONCE AS NEEDED
Status: CANCELLED | OUTPATIENT
Start: 2023-12-22

## 2023-12-22 RX ORDER — METOCLOPRAMIDE 10 MG/1
10 TABLET ORAL EVERY 6 HOURS PRN
Status: CANCELLED | OUTPATIENT
Start: 2023-12-22

## 2023-12-22 RX ORDER — OXYTOCIN 10 [USP'U]/ML
10 INJECTION, SOLUTION INTRAMUSCULAR; INTRAVENOUS ONCE AS NEEDED
Status: CANCELLED | OUTPATIENT
Start: 2023-12-22

## 2023-12-22 RX ORDER — TRANEXAMIC ACID 100 MG/ML
1000 INJECTION, SOLUTION INTRAVENOUS ONCE AS NEEDED
Status: CANCELLED | OUTPATIENT
Start: 2023-12-22

## 2023-12-22 RX ORDER — LABETALOL HYDROCHLORIDE 5 MG/ML
20 INJECTION, SOLUTION INTRAVENOUS ONCE AS NEEDED
Status: CANCELLED | OUTPATIENT
Start: 2023-12-22

## 2023-12-22 RX ORDER — LOPERAMIDE HYDROCHLORIDE 2 MG/1
4 CAPSULE ORAL EVERY 2 HOUR PRN
Status: CANCELLED | OUTPATIENT
Start: 2023-12-22

## 2023-12-22 RX ORDER — HYDRALAZINE HYDROCHLORIDE 20 MG/ML
5 INJECTION INTRAMUSCULAR; INTRAVENOUS ONCE AS NEEDED
Status: CANCELLED | OUTPATIENT
Start: 2023-12-22

## 2023-12-22 RX ORDER — SODIUM CHLORIDE, SODIUM LACTATE, POTASSIUM CHLORIDE, CALCIUM CHLORIDE 600; 310; 30; 20 MG/100ML; MG/100ML; MG/100ML; MG/100ML
125 INJECTION, SOLUTION INTRAVENOUS CONTINUOUS
Status: CANCELLED | OUTPATIENT
Start: 2023-12-22

## 2023-12-22 RX ORDER — MISOPROSTOL 100 UG/1
25 TABLET ORAL ONCE
Status: CANCELLED | OUTPATIENT
Start: 2023-12-22

## 2023-12-22 RX ORDER — TERBUTALINE SULFATE 1 MG/ML
0.25 INJECTION SUBCUTANEOUS ONCE AS NEEDED
Status: CANCELLED | OUTPATIENT
Start: 2023-12-22

## 2023-12-22 RX ORDER — ONDANSETRON 4 MG/1
4 TABLET, FILM COATED ORAL EVERY 6 HOURS PRN
Status: CANCELLED | OUTPATIENT
Start: 2023-12-22

## 2023-12-22 RX ORDER — LIDOCAINE HYDROCHLORIDE 10 MG/ML
30 INJECTION INFILTRATION; PERINEURAL ONCE AS NEEDED
Status: CANCELLED | OUTPATIENT
Start: 2023-12-22

## 2023-12-22 RX ORDER — METHYLERGONOVINE MALEATE 0.2 MG/ML
0.2 INJECTION INTRAVENOUS ONCE AS NEEDED
Status: CANCELLED | OUTPATIENT
Start: 2023-12-22

## 2023-12-22 RX ORDER — CARBOPROST TROMETHAMINE 250 UG/ML
250 INJECTION, SOLUTION INTRAMUSCULAR ONCE AS NEEDED
Status: CANCELLED | OUTPATIENT
Start: 2023-12-22

## 2023-12-22 RX ORDER — NIFEDIPINE 10 MG/1
10 CAPSULE ORAL ONCE AS NEEDED
Status: CANCELLED | OUTPATIENT
Start: 2023-12-22

## 2023-12-22 RX ORDER — METOCLOPRAMIDE HYDROCHLORIDE 5 MG/ML
10 INJECTION INTRAMUSCULAR; INTRAVENOUS EVERY 6 HOURS PRN
Status: CANCELLED | OUTPATIENT
Start: 2023-12-22

## 2023-12-22 NOTE — PROGRESS NOTES
Prenatal Visit    Patient presents for routine prenatal visit.   Feeling well.  Baby is moving. No abdominal pain. No bleeding. No leaking fluid.    Objective   Physical Exam:   Weight: 106 kg (234 lb)  Expected Total Weight Gain: 5 kg (11 lb)-9 kg (19 lb)   Pregravid BMI: 40.27  BP: 140/70                  Assessment/Plan   1. Gestational hypertension, third trimester  - Fetal nonstress test  2. 36 weeks gestation of pregnancy  - POC Urine Dip  - Fetal nonstress test    NST today: Category 1, reactive.  BP mild today, has been normal to mild at home. No new symptoms.  Has IOL tomorrow.  Continue prenatal vitamins  Precautions given. Advised her to call for any concerns.

## 2023-12-22 NOTE — H&P
Obstetrical Admission History and Physical     Alison Smith is a 28 y.o.  at 36w5d. NUPUR: 2024, by Ultrasound. Estimated fetal weight: 6 pounds. She has had prenatal care with [facility] .    Chief Complaint: Gestational hypertension    Assessment/Plan    -T&S, CBC, PEC labs.  -Cytotec per protocol.  -GBS pending at time of H&P.  -epidural prn.    Active Problems:  There are no active Hospital Problems.      Pregnancy Problems (from 22 to present)       Problem Noted Resolved    Gestational hypertension, third trimester 2023 by Cielo Shirley, APRN-CNP No    Priority:  Medium      26 weeks gestation of pregnancy 10/9/2023 by Jung Sol MD No    Priority:  Medium      Anxiety during pregnancy 10/9/2023 by Jung Sol MD No    Priority:  Medium      Pregnancy with abdominal cramping of lower quadrant, antepartum 10/9/2023 by Jung Sol MD No    Priority:  Medium              Subjective   Alison is here diagnosed with gestational hypertension. She has denied H/A, RUQ pain and visual changes. The pregnancy is also complicated by BMI>40.     Obstetrical History   OB History    Para Term  AB Living   1 0 0 0 0 0   SAB IAB Ectopic Multiple Live Births   0 0 0 0 0      # Outcome Date GA Lbr Jim/2nd Weight Sex Delivery Anes PTL Lv   1 Current                Past Medical History  Past Medical History:   Diagnosis Date    Acute maxillary sinusitis, unspecified 10/28/2020    Acute maxillary sinusitis, unspecified    Adjustment disorder with mixed anxiety and depressed mood 03/15/2017    Adjustment disorder with mixed anxiety and depressed mood    Calculus of bile duct without cholangitis or cholecystitis without obstruction     Gall bladder pain    Contact with and (suspected) exposure to other viral communicable diseases 2022    Contact with or exposure to viral disease    Depression     Eczema     Exercise-induced asthma     Fatigue, unspecified type      Generalized anxiety disorder     HPV (human papilloma virus) infection     Nausea and vomiting in pregnancy     Noninfective gastroenteritis and colitis, unspecified 08/23/2022    Gastroenteritis, acute    Personal history of other specified conditions 03/15/2017    History of left flank pain    Varicella         Past Surgical History   Past Surgical History:   Procedure Laterality Date    NASAL ENDOSCOPY      OTHER SURGICAL HISTORY  10/22/2019    No history of surgery       Social History  Social History     Tobacco Use    Smoking status: Never    Smokeless tobacco: Never   Substance Use Topics    Alcohol use: Not Currently     Substance and Sexual Activity   Drug Use Never       Allergies  Amoxicillin, Penicillin, and Penicillins     Medications  (Not in a hospital admission)      Objective    Last Vitals  Temp Pulse Resp BP MAP O2 Sat                   Physical Examination  GENERAL: Examination reveals a well developed, well nourished, gravid female in no acute distress. She is alert and cooperative.  LUNGS: normal respiratory effort.  HEART: regular rate and rhythm, S1, S2 normal, no murmur, click, rub or gallop  ABDOMEN: soft, gravid, nontender, nondistended, no abnormal masses, no epigastric pain  FHR is 140s   [place] reading:  none  The fetus is in a vertex presentation, determined by Leopold's maneuver  Current Estimated Fetal Weight 6 pounds established by Leopold's maneuver  CERVIX: cl  cm dilated, 50  % effaced, -3  station; MEMBRANES are    EXTREMITIES: no redness or tenderness in the calves or thighs, no edema  PSYCHOLOGICAL: awake and alert; oriented to person, place, and time    Lab Review  Labs in chart were reviewed.

## 2023-12-22 NOTE — PROCEDURES
Alison MSITH Luis, a  at 36w5d with an NUPUR of 2024, by Ultrasound, was seen at [facility] for a nonstress test.    Non-Stress Test   Baseline Fetal Heart Rate for Non-Stress Test: 140 BPM  Variability in Waveform for Non-Stress Test: Moderate  Accelerations in Non-Stress Test: Yes  Decelerations in Non-Stress Test: None  Contractions in Non-Stress Test: Not present  Acoustic Stimulator for Non-Stress Test: No  Interpretation of Non-Stress Test   Interpretation of Non-Stress Test: Reactive  Comments on Non-Stress Test: Category 1  NST for Multiple Fetuses: No                                       no blurred vision/no confusion/no dizziness/no fever/no loss of consciousness/no nausea/no numbness/no vomiting/no weakness/no change in level of consciousness

## 2023-12-23 ENCOUNTER — HOSPITAL ENCOUNTER (INPATIENT)
Facility: HOSPITAL | Age: 28
LOS: 3 days | Discharge: HOME | End: 2023-12-26
Attending: OBSTETRICS & GYNECOLOGY | Admitting: OBSTETRICS & GYNECOLOGY
Payer: COMMERCIAL

## 2023-12-23 LAB
ABO GROUP (TYPE) IN BLOOD: NORMAL
ALBUMIN SERPL BCP-MCNC: 3.6 G/DL (ref 3.4–5)
ALP SERPL-CCNC: 95 U/L (ref 33–110)
ALT SERPL W P-5'-P-CCNC: 7 U/L (ref 7–45)
ANION GAP SERPL CALC-SCNC: 14 MMOL/L (ref 10–20)
ANTIBODY SCREEN: NORMAL
AST SERPL W P-5'-P-CCNC: 12 U/L (ref 9–39)
BILIRUB SERPL-MCNC: 0.3 MG/DL (ref 0–1.2)
BUN SERPL-MCNC: 12 MG/DL (ref 6–23)
CALCIUM SERPL-MCNC: 8.8 MG/DL (ref 8.6–10.3)
CHLORIDE SERPL-SCNC: 105 MMOL/L (ref 98–107)
CO2 SERPL-SCNC: 21 MMOL/L (ref 21–32)
CREAT SERPL-MCNC: 0.53 MG/DL (ref 0.5–1.05)
CREAT UR-MCNC: 90.6 MG/DL (ref 20–320)
ERYTHROCYTE [DISTWIDTH] IN BLOOD BY AUTOMATED COUNT: 13.6 % (ref 11.5–14.5)
GFR SERPL CREATININE-BSD FRML MDRD: >90 ML/MIN/1.73M*2
GLUCOSE SERPL-MCNC: 89 MG/DL (ref 74–99)
GP B STREP GENITAL QL CULT: NORMAL
HCT VFR BLD AUTO: 32.6 % (ref 36–46)
HGB BLD-MCNC: 10.8 G/DL (ref 12–16)
LDH SERPL L TO P-CCNC: 136 U/L (ref 84–246)
MCH RBC QN AUTO: 27.7 PG (ref 26–34)
MCHC RBC AUTO-ENTMCNC: 33.1 G/DL (ref 32–36)
MCV RBC AUTO: 84 FL (ref 80–100)
NRBC BLD-RTO: 0 /100 WBCS (ref 0–0)
PLATELET # BLD AUTO: 236 X10*3/UL (ref 150–450)
POTASSIUM SERPL-SCNC: 3.8 MMOL/L (ref 3.5–5.3)
PROT SERPL-MCNC: 6.6 G/DL (ref 6.4–8.2)
PROT UR-ACNC: 19 MG/DL (ref 5–24)
PROT/CREAT UR: 0.21 MG/MG CREAT (ref 0–0.17)
RBC # BLD AUTO: 3.9 X10*6/UL (ref 4–5.2)
RH FACTOR (ANTIGEN D): NORMAL
SODIUM SERPL-SCNC: 136 MMOL/L (ref 136–145)
URATE SERPL-MCNC: 2.4 MG/DL (ref 2.3–6.7)
WBC # BLD AUTO: 12.9 X10*3/UL (ref 4.4–11.3)

## 2023-12-23 PROCEDURE — 2500000004 HC RX 250 GENERAL PHARMACY W/ HCPCS (ALT 636 FOR OP/ED): Performed by: OBSTETRICS & GYNECOLOGY

## 2023-12-23 PROCEDURE — 36415 COLL VENOUS BLD VENIPUNCTURE: CPT | Performed by: OBSTETRICS & GYNECOLOGY

## 2023-12-23 PROCEDURE — 85027 COMPLETE CBC AUTOMATED: CPT | Performed by: OBSTETRICS & GYNECOLOGY

## 2023-12-23 PROCEDURE — 83615 LACTATE (LD) (LDH) ENZYME: CPT | Performed by: OBSTETRICS & GYNECOLOGY

## 2023-12-23 PROCEDURE — 3E0P7VZ INTRODUCTION OF HORMONE INTO FEMALE REPRODUCTIVE, VIA NATURAL OR ARTIFICIAL OPENING: ICD-10-PCS | Performed by: OBSTETRICS & GYNECOLOGY

## 2023-12-23 PROCEDURE — 86780 TREPONEMA PALLIDUM: CPT | Mod: GEALAB | Performed by: OBSTETRICS & GYNECOLOGY

## 2023-12-23 PROCEDURE — 80053 COMPREHEN METABOLIC PANEL: CPT | Performed by: OBSTETRICS & GYNECOLOGY

## 2023-12-23 PROCEDURE — 84550 ASSAY OF BLOOD/URIC ACID: CPT | Performed by: OBSTETRICS & GYNECOLOGY

## 2023-12-23 PROCEDURE — 82570 ASSAY OF URINE CREATININE: CPT | Performed by: OBSTETRICS & GYNECOLOGY

## 2023-12-23 PROCEDURE — 1120000001 HC OB PRIVATE ROOM DAILY

## 2023-12-23 PROCEDURE — 2500000001 HC RX 250 WO HCPCS SELF ADMINISTERED DRUGS (ALT 637 FOR MEDICARE OP): Performed by: OBSTETRICS & GYNECOLOGY

## 2023-12-23 PROCEDURE — 86901 BLOOD TYPING SEROLOGIC RH(D): CPT | Performed by: OBSTETRICS & GYNECOLOGY

## 2023-12-23 RX ORDER — METHYLERGONOVINE MALEATE 0.2 MG/ML
0.2 INJECTION INTRAVENOUS ONCE AS NEEDED
Status: DISCONTINUED | OUTPATIENT
Start: 2023-12-23 | End: 2023-12-26 | Stop reason: HOSPADM

## 2023-12-23 RX ORDER — OXYTOCIN 10 [USP'U]/ML
10 INJECTION, SOLUTION INTRAMUSCULAR; INTRAVENOUS ONCE AS NEEDED
Status: DISCONTINUED | OUTPATIENT
Start: 2023-12-23 | End: 2023-12-26 | Stop reason: HOSPADM

## 2023-12-23 RX ORDER — LIDOCAINE HYDROCHLORIDE 10 MG/ML
30 INJECTION INFILTRATION; PERINEURAL ONCE AS NEEDED
Status: COMPLETED | OUTPATIENT
Start: 2023-12-23 | End: 2023-12-24

## 2023-12-23 RX ORDER — ONDANSETRON HYDROCHLORIDE 2 MG/ML
4 INJECTION, SOLUTION INTRAVENOUS EVERY 6 HOURS PRN
Status: DISCONTINUED | OUTPATIENT
Start: 2023-12-23 | End: 2023-12-26 | Stop reason: HOSPADM

## 2023-12-23 RX ORDER — LABETALOL HYDROCHLORIDE 5 MG/ML
20 INJECTION, SOLUTION INTRAVENOUS ONCE AS NEEDED
Status: DISCONTINUED | OUTPATIENT
Start: 2023-12-23 | End: 2023-12-26 | Stop reason: HOSPADM

## 2023-12-23 RX ORDER — TERBUTALINE SULFATE 1 MG/ML
0.25 INJECTION SUBCUTANEOUS ONCE AS NEEDED
Status: DISCONTINUED | OUTPATIENT
Start: 2023-12-23 | End: 2023-12-26 | Stop reason: HOSPADM

## 2023-12-23 RX ORDER — NIFEDIPINE 10 MG/1
10 CAPSULE ORAL ONCE AS NEEDED
Status: DISCONTINUED | OUTPATIENT
Start: 2023-12-23 | End: 2023-12-26 | Stop reason: HOSPADM

## 2023-12-23 RX ORDER — SODIUM CHLORIDE, SODIUM LACTATE, POTASSIUM CHLORIDE, CALCIUM CHLORIDE 600; 310; 30; 20 MG/100ML; MG/100ML; MG/100ML; MG/100ML
125 INJECTION, SOLUTION INTRAVENOUS CONTINUOUS
Status: DISCONTINUED | OUTPATIENT
Start: 2023-12-23 | End: 2023-12-26 | Stop reason: HOSPADM

## 2023-12-23 RX ORDER — METOCLOPRAMIDE 10 MG/1
10 TABLET ORAL EVERY 6 HOURS PRN
Status: DISCONTINUED | OUTPATIENT
Start: 2023-12-23 | End: 2023-12-26 | Stop reason: HOSPADM

## 2023-12-23 RX ORDER — ONDANSETRON 4 MG/1
4 TABLET, FILM COATED ORAL EVERY 6 HOURS PRN
Status: DISCONTINUED | OUTPATIENT
Start: 2023-12-23 | End: 2023-12-26 | Stop reason: HOSPADM

## 2023-12-23 RX ORDER — TRANEXAMIC ACID 100 MG/ML
1000 INJECTION, SOLUTION INTRAVENOUS ONCE AS NEEDED
Status: DISCONTINUED | OUTPATIENT
Start: 2023-12-23 | End: 2023-12-26 | Stop reason: HOSPADM

## 2023-12-23 RX ORDER — LOPERAMIDE HYDROCHLORIDE 2 MG/1
4 CAPSULE ORAL EVERY 2 HOUR PRN
Status: DISCONTINUED | OUTPATIENT
Start: 2023-12-23 | End: 2023-12-26 | Stop reason: HOSPADM

## 2023-12-23 RX ORDER — CARBOPROST TROMETHAMINE 250 UG/ML
250 INJECTION, SOLUTION INTRAMUSCULAR ONCE AS NEEDED
Status: DISCONTINUED | OUTPATIENT
Start: 2023-12-23 | End: 2023-12-26 | Stop reason: HOSPADM

## 2023-12-23 RX ORDER — MISOPROSTOL 200 UG/1
800 TABLET ORAL ONCE AS NEEDED
Status: DISCONTINUED | OUTPATIENT
Start: 2023-12-23 | End: 2023-12-26 | Stop reason: HOSPADM

## 2023-12-23 RX ORDER — HYDRALAZINE HYDROCHLORIDE 20 MG/ML
5 INJECTION INTRAMUSCULAR; INTRAVENOUS ONCE AS NEEDED
Status: DISCONTINUED | OUTPATIENT
Start: 2023-12-23 | End: 2023-12-26 | Stop reason: HOSPADM

## 2023-12-23 RX ORDER — OXYTOCIN/0.9 % SODIUM CHLORIDE 30/500 ML
60 PLASTIC BAG, INJECTION (ML) INTRAVENOUS ONCE AS NEEDED
Status: DISCONTINUED | OUTPATIENT
Start: 2023-12-23 | End: 2023-12-26 | Stop reason: HOSPADM

## 2023-12-23 RX ORDER — METOCLOPRAMIDE HYDROCHLORIDE 5 MG/ML
10 INJECTION INTRAMUSCULAR; INTRAVENOUS EVERY 6 HOURS PRN
Status: DISCONTINUED | OUTPATIENT
Start: 2023-12-23 | End: 2023-12-26 | Stop reason: HOSPADM

## 2023-12-23 RX ADMIN — MISOPROSTOL 25 MCG: 100 TABLET ORAL at 23:05

## 2023-12-23 RX ADMIN — SODIUM CHLORIDE, POTASSIUM CHLORIDE, SODIUM LACTATE AND CALCIUM CHLORIDE 125 ML/HR: 600; 310; 30; 20 INJECTION, SOLUTION INTRAVENOUS at 21:30

## 2023-12-23 SDOH — SOCIAL STABILITY: SOCIAL INSECURITY: VERBAL ABUSE: DENIES

## 2023-12-23 SDOH — SOCIAL STABILITY: SOCIAL INSECURITY: PHYSICAL ABUSE: DENIES

## 2023-12-23 SDOH — ECONOMIC STABILITY: HOUSING INSECURITY: DO YOU FEEL UNSAFE GOING BACK TO THE PLACE WHERE YOU ARE LIVING?: NO

## 2023-12-23 SDOH — HEALTH STABILITY: MENTAL HEALTH: WISH TO BE DEAD (PAST 1 MONTH): NO

## 2023-12-23 SDOH — SOCIAL STABILITY: SOCIAL INSECURITY: ABUSE SCREEN: ADULT

## 2023-12-23 SDOH — SOCIAL STABILITY: SOCIAL INSECURITY: DOES ANYONE TRY TO KEEP YOU FROM HAVING/CONTACTING OTHER FRIENDS OR DOING THINGS OUTSIDE YOUR HOME?: NO

## 2023-12-23 SDOH — HEALTH STABILITY: MENTAL HEALTH: HAVE YOU USED ANY SUBSTANCES (CANABIS, COCAINE, HEROIN, HALLUCINOGENS, INHALANTS, ETC.) IN THE PAST 12 MONTHS?: NO

## 2023-12-23 SDOH — SOCIAL STABILITY: SOCIAL INSECURITY: ARE YOU OR HAVE YOU BEEN THREATENED OR ABUSED PHYSICALLY, EMOTIONALLY, OR SEXUALLY BY ANYONE?: NO

## 2023-12-23 SDOH — SOCIAL STABILITY: SOCIAL INSECURITY: ARE THERE ANY APPARENT SIGNS OF INJURIES/BEHAVIORS THAT COULD BE RELATED TO ABUSE/NEGLECT?: NO

## 2023-12-23 SDOH — HEALTH STABILITY: MENTAL HEALTH: WERE YOU ABLE TO COMPLETE ALL THE BEHAVIORAL HEALTH SCREENINGS?: YES

## 2023-12-23 SDOH — SOCIAL STABILITY: SOCIAL INSECURITY: HAS ANYONE EVER THREATENED TO HURT YOUR FAMILY OR YOUR PETS?: NO

## 2023-12-23 SDOH — SOCIAL STABILITY: SOCIAL INSECURITY: DO YOU FEEL ANYONE HAS EXPLOITED OR TAKEN ADVANTAGE OF YOU FINANCIALLY OR OF YOUR PERSONAL PROPERTY?: NO

## 2023-12-23 SDOH — SOCIAL STABILITY: SOCIAL INSECURITY: HAVE YOU HAD THOUGHTS OF HARMING ANYONE ELSE?: NO

## 2023-12-23 SDOH — HEALTH STABILITY: MENTAL HEALTH: SUICIDAL BEHAVIOR (LIFETIME): NO

## 2023-12-23 SDOH — HEALTH STABILITY: MENTAL HEALTH: NON-SPECIFIC ACTIVE SUICIDAL THOUGHTS (PAST 1 MONTH): NO

## 2023-12-23 SDOH — HEALTH STABILITY: MENTAL HEALTH: HAVE YOU USED ANY PRESCRIPTION DRUGS OTHER THAN PRESCRIBED IN THE PAST 12 MONTHS?: NO

## 2023-12-23 ASSESSMENT — ACTIVITIES OF DAILY LIVING (ADL)
ADEQUATE_TO_COMPLETE_ADL: YES
TOILETING: INDEPENDENT
HEARING - LEFT EAR: FUNCTIONAL
BATHING: INDEPENDENT
JUDGMENT_ADEQUATE_SAFELY_COMPLETE_DAILY_ACTIVITIES: YES
FEEDING YOURSELF: INDEPENDENT
PATIENT'S MEMORY ADEQUATE TO SAFELY COMPLETE DAILY ACTIVITIES?: YES
WALKS IN HOME: INDEPENDENT
GROOMING: INDEPENDENT
HEARING - RIGHT EAR: FUNCTIONAL
DRESSING YOURSELF: INDEPENDENT
LACK_OF_TRANSPORTATION: PATIENT DECLINED

## 2023-12-23 ASSESSMENT — PATIENT HEALTH QUESTIONNAIRE - PHQ9
1. LITTLE INTEREST OR PLEASURE IN DOING THINGS: NOT AT ALL
SUM OF ALL RESPONSES TO PHQ9 QUESTIONS 1 & 2: 0
2. FEELING DOWN, DEPRESSED OR HOPELESS: NOT AT ALL

## 2023-12-23 ASSESSMENT — LIFESTYLE VARIABLES
SKIP TO QUESTIONS 9-10: 1
HOW OFTEN DO YOU HAVE A DRINK CONTAINING ALCOHOL: NEVER
AUDIT-C TOTAL SCORE: 0
HOW OFTEN DO YOU HAVE 6 OR MORE DRINKS ON ONE OCCASION: NEVER
HOW MANY STANDARD DRINKS CONTAINING ALCOHOL DO YOU HAVE ON A TYPICAL DAY: PATIENT DOES NOT DRINK
AUDIT-C TOTAL SCORE: 0

## 2023-12-23 ASSESSMENT — PAIN SCALES - GENERAL: PAINLEVEL_OUTOF10: 0 - NO PAIN

## 2023-12-24 ENCOUNTER — ANESTHESIA EVENT (OUTPATIENT)
Dept: OBSTETRICS AND GYNECOLOGY | Facility: HOSPITAL | Age: 28
End: 2023-12-24
Payer: COMMERCIAL

## 2023-12-24 ENCOUNTER — ANESTHESIA (OUTPATIENT)
Dept: OBSTETRICS AND GYNECOLOGY | Facility: HOSPITAL | Age: 28
End: 2023-12-24
Payer: COMMERCIAL

## 2023-12-24 LAB — T PALLIDUM AB SER QL: NONREACTIVE

## 2023-12-24 PROCEDURE — 88307 TISSUE EXAM BY PATHOLOGIST: CPT | Mod: TC,SUR,GEALAB | Performed by: OBSTETRICS & GYNECOLOGY

## 2023-12-24 PROCEDURE — 59400 OBSTETRICAL CARE: CPT | Performed by: OBSTETRICS & GYNECOLOGY

## 2023-12-24 PROCEDURE — 0UQGXZZ REPAIR VAGINA, EXTERNAL APPROACH: ICD-10-PCS | Performed by: OBSTETRICS & GYNECOLOGY

## 2023-12-24 PROCEDURE — 7210000002 HC LABOR PER HOUR

## 2023-12-24 PROCEDURE — 96372 THER/PROPH/DIAG INJ SC/IM: CPT | Performed by: OBSTETRICS & GYNECOLOGY

## 2023-12-24 PROCEDURE — 2720000007 HC OR 272 NO HCPCS

## 2023-12-24 PROCEDURE — 2500000004 HC RX 250 GENERAL PHARMACY W/ HCPCS (ALT 636 FOR OP/ED): Performed by: OBSTETRICS & GYNECOLOGY

## 2023-12-24 PROCEDURE — 51701 INSERT BLADDER CATHETER: CPT

## 2023-12-24 PROCEDURE — 51702 INSERT TEMP BLADDER CATH: CPT

## 2023-12-24 PROCEDURE — 7100000016 HC LABOR RECOVERY PER HOUR

## 2023-12-24 PROCEDURE — 59050 FETAL MONITOR W/REPORT: CPT

## 2023-12-24 PROCEDURE — 2500000001 HC RX 250 WO HCPCS SELF ADMINISTERED DRUGS (ALT 637 FOR MEDICARE OP)

## 2023-12-24 PROCEDURE — 88307 TISSUE EXAM BY PATHOLOGIST: CPT | Performed by: PATHOLOGY

## 2023-12-24 PROCEDURE — 2500000001 HC RX 250 WO HCPCS SELF ADMINISTERED DRUGS (ALT 637 FOR MEDICARE OP): Performed by: OBSTETRICS & GYNECOLOGY

## 2023-12-24 PROCEDURE — 2500000005 HC RX 250 GENERAL PHARMACY W/O HCPCS: Performed by: NURSE ANESTHETIST, CERTIFIED REGISTERED

## 2023-12-24 PROCEDURE — 2500000005 HC RX 250 GENERAL PHARMACY W/O HCPCS: Performed by: OBSTETRICS & GYNECOLOGY

## 2023-12-24 PROCEDURE — 59409 OBSTETRICAL CARE: CPT | Performed by: OBSTETRICS & GYNECOLOGY

## 2023-12-24 PROCEDURE — 2500000004 HC RX 250 GENERAL PHARMACY W/ HCPCS (ALT 636 FOR OP/ED): Performed by: NURSE ANESTHETIST, CERTIFIED REGISTERED

## 2023-12-24 PROCEDURE — 1120000001 HC OB PRIVATE ROOM DAILY

## 2023-12-24 PROCEDURE — 3E033VJ INTRODUCTION OF OTHER HORMONE INTO PERIPHERAL VEIN, PERCUTANEOUS APPROACH: ICD-10-PCS | Performed by: OBSTETRICS & GYNECOLOGY

## 2023-12-24 RX ORDER — OXYTOCIN/0.9 % SODIUM CHLORIDE 30/500 ML
2-30 PLASTIC BAG, INJECTION (ML) INTRAVENOUS CONTINUOUS
Status: DISCONTINUED | OUTPATIENT
Start: 2023-12-24 | End: 2023-12-26 | Stop reason: HOSPADM

## 2023-12-24 RX ORDER — SIMETHICONE 80 MG
80 TABLET,CHEWABLE ORAL 4 TIMES DAILY PRN
Status: DISCONTINUED | OUTPATIENT
Start: 2023-12-24 | End: 2023-12-26 | Stop reason: HOSPADM

## 2023-12-24 RX ORDER — HYDRALAZINE HYDROCHLORIDE 20 MG/ML
5 INJECTION INTRAMUSCULAR; INTRAVENOUS ONCE AS NEEDED
Status: DISCONTINUED | OUTPATIENT
Start: 2023-12-24 | End: 2023-12-26 | Stop reason: HOSPADM

## 2023-12-24 RX ORDER — MISOPROSTOL 200 UG/1
800 TABLET ORAL ONCE AS NEEDED
Status: DISCONTINUED | OUTPATIENT
Start: 2023-12-24 | End: 2023-12-26 | Stop reason: HOSPADM

## 2023-12-24 RX ORDER — METHYLERGONOVINE MALEATE 0.2 MG/ML
0.2 INJECTION INTRAVENOUS ONCE AS NEEDED
Status: DISCONTINUED | OUTPATIENT
Start: 2023-12-24 | End: 2023-12-26 | Stop reason: HOSPADM

## 2023-12-24 RX ORDER — ONDANSETRON 4 MG/1
4 TABLET, FILM COATED ORAL EVERY 6 HOURS PRN
Status: DISCONTINUED | OUTPATIENT
Start: 2023-12-24 | End: 2023-12-26 | Stop reason: HOSPADM

## 2023-12-24 RX ORDER — OXYTOCIN 10 [USP'U]/ML
10 INJECTION, SOLUTION INTRAMUSCULAR; INTRAVENOUS ONCE AS NEEDED
Status: DISCONTINUED | OUTPATIENT
Start: 2023-12-24 | End: 2023-12-26 | Stop reason: HOSPADM

## 2023-12-24 RX ORDER — FENTANYL/ROPIVACAINE/NS/PF 2MCG/ML-.2
0-25 PLASTIC BAG, INJECTION (ML) INJECTION CONTINUOUS
Status: DISCONTINUED | OUTPATIENT
Start: 2023-12-24 | End: 2023-12-26 | Stop reason: HOSPADM

## 2023-12-24 RX ORDER — DIPHENHYDRAMINE HCL 25 MG
25 CAPSULE ORAL EVERY 6 HOURS PRN
Status: DISCONTINUED | OUTPATIENT
Start: 2023-12-24 | End: 2023-12-26 | Stop reason: HOSPADM

## 2023-12-24 RX ORDER — OXYTOCIN/0.9 % SODIUM CHLORIDE 30/500 ML
60 PLASTIC BAG, INJECTION (ML) INTRAVENOUS ONCE AS NEEDED
Status: DISCONTINUED | OUTPATIENT
Start: 2023-12-24 | End: 2023-12-26 | Stop reason: HOSPADM

## 2023-12-24 RX ORDER — MAGNESIUM HYDROXIDE 2400 MG/10ML
10 SUSPENSION ORAL
Status: DISCONTINUED | OUTPATIENT
Start: 2023-12-24 | End: 2023-12-26 | Stop reason: HOSPADM

## 2023-12-24 RX ORDER — IBUPROFEN 600 MG/1
600 TABLET ORAL EVERY 6 HOURS
Status: DISCONTINUED | OUTPATIENT
Start: 2023-12-24 | End: 2023-12-26 | Stop reason: HOSPADM

## 2023-12-24 RX ORDER — BUPIVACAINE HYDROCHLORIDE 2.5 MG/ML
INJECTION, SOLUTION EPIDURAL; INFILTRATION; INTRACAUDAL AS NEEDED
Status: DISCONTINUED | OUTPATIENT
Start: 2023-12-24 | End: 2023-12-24

## 2023-12-24 RX ORDER — ACETAMINOPHEN 325 MG/1
975 TABLET ORAL EVERY 6 HOURS
Status: DISCONTINUED | OUTPATIENT
Start: 2023-12-24 | End: 2023-12-26 | Stop reason: HOSPADM

## 2023-12-24 RX ORDER — NIFEDIPINE 10 MG/1
10 CAPSULE ORAL ONCE AS NEEDED
Status: DISCONTINUED | OUTPATIENT
Start: 2023-12-24 | End: 2023-12-26 | Stop reason: HOSPADM

## 2023-12-24 RX ORDER — LABETALOL HYDROCHLORIDE 5 MG/ML
20 INJECTION, SOLUTION INTRAVENOUS ONCE AS NEEDED
Status: DISCONTINUED | OUTPATIENT
Start: 2023-12-24 | End: 2023-12-26 | Stop reason: HOSPADM

## 2023-12-24 RX ORDER — FENTANYL/ROPIVACAINE/NS/PF 2MCG/ML-.2
PLASTIC BAG, INJECTION (ML) INJECTION
Status: COMPLETED
Start: 2023-12-24 | End: 2023-12-24

## 2023-12-24 RX ORDER — ONDANSETRON HYDROCHLORIDE 2 MG/ML
4 INJECTION, SOLUTION INTRAVENOUS EVERY 6 HOURS PRN
Status: DISCONTINUED | OUTPATIENT
Start: 2023-12-24 | End: 2023-12-26 | Stop reason: HOSPADM

## 2023-12-24 RX ORDER — POLYETHYLENE GLYCOL 3350 17 G/17G
17 POWDER, FOR SOLUTION ORAL 2 TIMES DAILY PRN
Status: DISCONTINUED | OUTPATIENT
Start: 2023-12-24 | End: 2023-12-26 | Stop reason: HOSPADM

## 2023-12-24 RX ORDER — CARBOPROST TROMETHAMINE 250 UG/ML
250 INJECTION, SOLUTION INTRAMUSCULAR ONCE AS NEEDED
Status: DISCONTINUED | OUTPATIENT
Start: 2023-12-24 | End: 2023-12-26 | Stop reason: HOSPADM

## 2023-12-24 RX ORDER — TRANEXAMIC ACID 100 MG/ML
1000 INJECTION, SOLUTION INTRAVENOUS ONCE AS NEEDED
Status: DISCONTINUED | OUTPATIENT
Start: 2023-12-24 | End: 2023-12-26 | Stop reason: HOSPADM

## 2023-12-24 RX ORDER — LIDOCAINE 560 MG/1
1 PATCH PERCUTANEOUS; TOPICAL; TRANSDERMAL
Status: DISCONTINUED | OUTPATIENT
Start: 2023-12-24 | End: 2023-12-26 | Stop reason: HOSPADM

## 2023-12-24 RX ORDER — DIPHENHYDRAMINE HYDROCHLORIDE 50 MG/ML
25 INJECTION INTRAMUSCULAR; INTRAVENOUS EVERY 6 HOURS PRN
Status: DISCONTINUED | OUTPATIENT
Start: 2023-12-24 | End: 2023-12-26 | Stop reason: HOSPADM

## 2023-12-24 RX ORDER — BISACODYL 10 MG/1
10 SUPPOSITORY RECTAL DAILY PRN
Status: DISCONTINUED | OUTPATIENT
Start: 2023-12-24 | End: 2023-12-26 | Stop reason: HOSPADM

## 2023-12-24 RX ORDER — ENOXAPARIN SODIUM 100 MG/ML
60 INJECTION SUBCUTANEOUS EVERY 24 HOURS
Status: DISCONTINUED | OUTPATIENT
Start: 2023-12-25 | End: 2023-12-26 | Stop reason: HOSPADM

## 2023-12-24 RX ORDER — LOPERAMIDE HYDROCHLORIDE 2 MG/1
4 CAPSULE ORAL EVERY 2 HOUR PRN
Status: DISCONTINUED | OUTPATIENT
Start: 2023-12-24 | End: 2023-12-26 | Stop reason: HOSPADM

## 2023-12-24 RX ORDER — LIDOCAINE HYDROCHLORIDE 10 MG/ML
INJECTION INFILTRATION; PERINEURAL AS NEEDED
Status: DISCONTINUED | OUTPATIENT
Start: 2023-12-24 | End: 2023-12-24

## 2023-12-24 RX ADMIN — LIDOCAINE HYDROCHLORIDE 2 ML: 10 INJECTION, SOLUTION INFILTRATION; PERINEURAL at 05:33

## 2023-12-24 RX ADMIN — IBUPROFEN 600 MG: 600 TABLET, FILM COATED ORAL at 20:50

## 2023-12-24 RX ADMIN — SODIUM CHLORIDE, POTASSIUM CHLORIDE, SODIUM LACTATE AND CALCIUM CHLORIDE 125 ML/HR: 600; 310; 30; 20 INJECTION, SOLUTION INTRAVENOUS at 09:36

## 2023-12-24 RX ADMIN — BUPIVACAINE HYDROCHLORIDE 10 ML: 2.5 INJECTION, SOLUTION EPIDURAL; INFILTRATION; INTRACAUDAL; PERINEURAL at 10:30

## 2023-12-24 RX ADMIN — Medication 8 ML/HR: at 05:48

## 2023-12-24 RX ADMIN — Medication 2 MILLI-UNITS/MIN: at 09:00

## 2023-12-24 RX ADMIN — ACETAMINOPHEN 975 MG: 325 TABLET ORAL at 20:50

## 2023-12-24 RX ADMIN — LIDOCAINE HYDROCHLORIDE 6 ML: 10 INJECTION, SOLUTION INFILTRATION; PERINEURAL at 05:44

## 2023-12-24 RX ADMIN — MISOPROSTOL 25 MCG: 100 TABLET ORAL at 02:02

## 2023-12-24 RX ADMIN — ONDANSETRON 4 MG: 2 INJECTION INTRAMUSCULAR; INTRAVENOUS at 05:01

## 2023-12-24 RX ADMIN — SODIUM CHLORIDE, POTASSIUM CHLORIDE, SODIUM LACTATE AND CALCIUM CHLORIDE 125 ML/HR: 600; 310; 30; 20 INJECTION, SOLUTION INTRAVENOUS at 05:31

## 2023-12-24 SDOH — HEALTH STABILITY: MENTAL HEALTH: CURRENT SMOKER: 0

## 2023-12-24 ASSESSMENT — PAIN SCALES - GENERAL
PAINLEVEL_OUTOF10: 2
PAIN_LEVEL: 2

## 2023-12-24 NOTE — LACTATION NOTE
Lactation Consultant Note  Lactation Consultation       Maternal Information       Maternal Assessment       Infant Assessment       Feeding Assessment       LATCH TOOL       Breast Pump       Other OB Lactation Tools       Patient Follow-up       Other OB Lactation Documentation       Recommendations/Summary  ***

## 2023-12-24 NOTE — ANESTHESIA PREPROCEDURE EVALUATION
Patient: Alison Smith    Evaluation Method: In-person visit    Procedure Information    Date: 12/24/23  Procedure: Labor Analgesia         Relevant Problems   Anesthesia (within normal limits)      Cardiovascular (within normal limits)      Endocrine   (+) Obesity in pregnancy      GI (within normal limits)      /Renal (within normal limits)      Neuro/Psych   (+) Bilateral carpal tunnel syndrome   (+) Generalized anxiety disorder      Pulmonary (within normal limits)      GI/Hepatic (within normal limits)      Hematology (within normal limits)      Musculoskeletal   (+) Bilateral carpal tunnel syndrome      Eyes, Ears, Nose, and Throat (within normal limits)      Infectious Disease (within normal limits)       Clinical information reviewed:    Allergies  Meds  Problems              NPO Detail:  No data recorded     OB/Gyn Evaluation    Present Pregnancy    Patient is pregnant now.  (+) , hypertensive disorder of pregnancy - gestational hypertension   Obstetric History                Physical Exam    Airway  Mallampati: II  TM distance: >3 FB  Neck ROM: full     Cardiovascular - normal exam  Rhythm: regular  Rate: normal     Dental - normal exam     Pulmonary - normal exam  Breath sounds clear to auscultation     Abdominal            Anesthesia Plan    ASA 3     epidural     The patient is not a current smoker.    Anesthetic plan and risks discussed with patient.  Use of blood products discussed with who consented to blood products.    Plan discussed with CRNA.

## 2023-12-24 NOTE — PROGRESS NOTES
Intrapartum Progress Note    Late entry from 0810am    Subjective   Comfortable with epidural.  Recd two cytotec over night.  SROM 0350am. Became uncomfortable after that and recd epidural.    Objective   Last Vitals:  /76 Temp   Pulse 94     Tracing: cat 1 tracing intermittent variable with contraction, accelerations  Ctx: every 3-4 mins    VE: 4cm/100%/-1/vtx    Start pitocin    Steph Richards MD

## 2023-12-24 NOTE — L&D DELIVERY NOTE
OB Delivery Note  2023  Alison Smith  28 y.o.   Vaginal, Spontaneous        The patient progressed to complete cervical dilation. The patient pushed for 2 hours and delivered by . Following delivery the infant was placed on the mother's chest for skin to skin care. Nuchal cord x 1 reduced. Delayed cord clamping , during which time the infant's nose and mouth were suctioned. The cord was clamped and cut. The placenta delivered spontaneously. A vaginal laceration was noted and repaired using 3.0 polysorb suture in the usual fashion. Hemostasis of the laceration was noted. The fundus was expressed and found to be firm and below the umbilicus. Postpartum bleeding was found to be appropriate.         Gestational Age: 37w0d  /Para:   Quantitative Blood Loss: Admission to Discharge: 0 mL (2023  8:59 PM - 2023  2:25 PM)    David Smith [[id number]]      Labor Events    Rupture date/time: 2023 0350  Rupture type: Spontaneous  Fluid color: Clear  Fluid odor: None  Complications: None       Placenta    Placenta delivery date/time: 2023 1414  Placenta removal: Spontaneous  Placenta appearance: Intact  Placenta disposition: pathology       Cord    Vessels: 3 vessels  Complications: None, Nuchal  Nuchal intervention: reduced  Nuchal cord description: loose nuchal cord  Number of loops: 1  Delayed cord clamping?: Yes  Cord blood disposition: Lab  Gases sent?: No  Stem cell collection (by provider): No       Lacerations    Episiotomy: None  Perineal laceration: None  Vaginal laceration?: Yes  Vaginal laceration repaired?: Yes  Repair suture: 3-0 Synthetic Suture       Anesthesia    Method: Epidural       Operative Delivery    Forceps attempted?: No  Vacuum extractor attempted?: No       Shoulder Dystocia    Shoulder dystocia present?: No        Delivery    Birth date/time: 2023 13:45:00  Delivery type: Vaginal, Spontaneous  Complications: None        Resuscitation    Method: Suctioning, Tactile stimulation       Apgars    Living status: Living  Apgar Component Scores:  1 min.:  5 min.:  10 min.:  15 min.:  20 min.:    Skin color:  1  1       Heart rate:  2  2       Reflex irritability:  2  2       Muscle tone:  1  2       Respiratory effort:  2  2       Total:  8  9       Apgars assigned by: LUAN ALONSO RN       Delivery Providers    Delivering clinician: Steph Richards MD   Provider Role    Izabela Ochoa RN Delivery Nurse    Luan Alonso RN Nursery Nurse     Resident                 Steph Richards MD

## 2023-12-24 NOTE — SIGNIFICANT EVENT
FHT difficult to monitor while patient lying on her side. After several attempts of readjusting the monitor the NOVII was applied to patient abdomen. FHR is now picking up with the monitor.

## 2023-12-24 NOTE — SIGNIFICANT EVENT
5:02- Pt sitting up in bed vomiting.    Pt positioned for epidural and vomiting at the same time.      0539- test dose for epidural

## 2023-12-24 NOTE — H&P
Shyla Hagen MD   Physician  Obstetrics and Gynecology     H&P      Signed     Encounter Date: 2023     Signed       Expand All Collapse All    Obstetrical Admission History and Physical      Alison Smith is a 28 y.o.  at 36w5d. NUPUR: 2024, by Ultrasound. Estimated fetal weight: 6 pounds. She has had prenatal care with [facility] .     Chief Complaint: Gestational hypertension     Assessment/Plan    -T&S, CBC, PEC labs.  -Cytotec per protocol.  -GBS pending at time of H&P.  -epidural prn.,,,,as   above    plan   vag   cytotec    then   po   q 2   hrs        Active Problems:  There are no active Hospital Problems.      Pregnancy Problems (from 23 to present)         Problem Noted Resolved     Gestational hypertension, third trimester 2023 by Cielo Shirley, CELE-CNP No     Priority:  Medium         26 weeks gestation of pregnancy 10/9/2023 by Jung Sol MD No     Priority:  Medium         Anxiety during pregnancy 10/9/2023 by Jung Sol MD No     Priority:  Medium         Pregnancy with abdominal cramping of lower quadrant, antepartum 10/9/2023 by Jung Sol MD No     Priority:  Medium                    Subjective   Alison is here diagnosed with gestational hypertension. She has denied H/A, RUQ pain and visual changes. The pregnancy is also complicated by BMI>40.  Obstetrical History                    OB History    Para Term  AB Living   1 0 0 0 0 0   SAB IAB Ectopic Multiple Live Births      0 0 0 0 0          # Outcome Date GA Lbr Jim/2nd Weight Sex Delivery Anes PTL Lv   1 Current                           Past Medical History  Medical History        Past Medical History:   Diagnosis Date    Acute maxillary sinusitis, unspecified 10/28/2020     Acute maxillary sinusitis, unspecified    Adjustment disorder with mixed anxiety and depressed mood 03/15/2017     Adjustment disorder with mixed anxiety and depressed mood    Calculus of bile duct  without cholangitis or cholecystitis without obstruction       Gall bladder pain    Contact with and (suspected) exposure to other viral communicable diseases 01/04/2022     Contact with or exposure to viral disease    Depression      Eczema      Exercise-induced asthma      Fatigue, unspecified type      Generalized anxiety disorder      HPV (human papilloma virus) infection      Nausea and vomiting in pregnancy      Noninfective gastroenteritis and colitis, unspecified 08/23/2022     Gastroenteritis, acute    Personal history of other specified conditions 03/15/2017     History of left flank pain    Varicella              Past Surgical History   Surgical History         Past Surgical History:   Procedure Laterality Date    NASAL ENDOSCOPY        OTHER SURGICAL HISTORY   10/22/2019     No history of surgery            Social History  Social History           Tobacco Use    Smoking status: Never    Smokeless tobacco: Never   Substance Use Topics    Alcohol use: Not Currently          Substance and Sexual Activity   Drug Use Never         Allergies  Amoxicillin, Penicillin, and Penicillins      Medications    Prescriptions Prior to Admission   (Not in a hospital admission)         Objective    Last Vitals  Temp Pulse Resp BP MAP O2 Sat              Physical Examination  GENERAL: Examination reveals a well developed, well nourished, gravid female in no acute distress. She is alert and cooperative.  LUNGS: normal respiratory effort.  HEART: regular rate and rhythm, S1, S2 normal, no murmur, click, rub or gallop  ABDOMEN: soft, gravid, nontender, nondistended, no abnormal masses, no epigastric pain  FHR is 140s   [place] reading:  none  The fetus is in a vertex presentation, determined by Leopold's maneuver  Current Estimated Fetal Weight 6 pounds established by Leopold's maneuver  CERVIX: cl  cm dilated, 50  % effaced, -3  station; MEMBRANES are    EXTREMITIES: no redness or tenderness in the calves or thighs, no  edema  PSYCHOLOGICAL: awake and alert; oriented to person, place, and time     Lab Review  Labs in chart were reviewed.               Electronically signed by Shyla Hagen MD at 12/22/2023  9:03 AM         Prep for Procedure on 12/22/2023          Note shared with patient  Additional Documentation    Vitals: LMP 04/04/2023   Flowsheets: Caprini Assessment   Encounter Info: Billing Info,     History,     Allergies     Orders Placed    None  Medication Changes      None  Medication List  Visit Diagnoses      None  Problem List

## 2023-12-24 NOTE — PROGRESS NOTES
Intrapartum Progress Note    Subjective   Feels pain and pressure.   Anesthesia adjusted epidural.    Objective   Last Vitals:  /75 Temp   Pulse (!) 123     Tracing: cat 2 tracing, variables with contractions to 100 bpm quick recovery, moderate variability in between.   Ctx: every 3-4 mins    Pit 6 mu    VE:  9cm/100%/0/vtx    Steph Richards MD

## 2023-12-24 NOTE — PROGRESS NOTES
37 weeks   gest   htn   for   iol   cx   closed   posterior,  vag   cytotec  placed then   po  q 2  hrs  fhts  cat 1  bp   nl  vtx   by   saeed

## 2023-12-24 NOTE — LACTATION NOTE
Lactation Consultant Note  Lactation Consultation  Reason for Consult: Initial assessment    Maternal Information  Has mother  before?: No  Exclusive Pump and Bottle Feed: Yes (mother wishes to pump and bottle feed once infant is born and does not plan to latch infant)    Maternal Assessment       Infant Assessment       Feeding Assessment  Nutrition Source: Breastmilk, Formula (per mother’s request)  Feeding Method: Feeding expressed breastmilk, Paced bottle    LATCH TOOL       Breast Pump       Other OB Lactation Tools       Patient Follow-up  Inpatient Lactation Follow-up Needed : Yes    Other OB Lactation Documentation  Maternal Risk Factors: Hypertension  Infant Risk Factors: Early term birth 37-39 weeks    Recommendations/Summary   patient in labor with epidural with plan to pump and bottle feed infant once born, does not wish to latch infant to breast. Mother reports positive breast changes with pregnancy, denies any breast surgeries or piercings. Mother will be returning back to work in March, has a personal breast pump for home use. Mother and father both deny any questions or concerns at this time.    Handouts provided, education reviewed- See education flow sheet for detailed list of breastfeeding topics covered. Reviewed breastfeeding education including benefits of breastfeeding, risks of introducing formula when not medically indicated, milk supply, pump vs baby, etc. Reviewed importance of frequent skin to skin contact, infant stomach capacity, value of colostrum feeds and typical  feeding behaviors in the first 24 hours.  See education flow sheet for detailed list of breastfeeding topics covered. Offered ongoing assistance with pumping as needed. Mother denies any further questions or concerns at this time.

## 2023-12-24 NOTE — ANESTHESIA PROCEDURE NOTES
Epidural Block    Patient location during procedure: OB  Start time: 12/24/2023 5:25 AM  End time: 12/24/2023 5:50 AM  Reason for block: labor analgesia  Staffing  Performed: CRNA   Authorized by: DIYA White    Performed by: DIYA White    Preanesthetic Checklist  Completed: patient identified, IV checked, risks and benefits discussed, surgical consent, pre-op evaluation, timeout performed and sterile techniques followed  Block Timeout  RN/Licensed healthcare professional reads aloud to the Anesthesia provider and entire team: Patient identity, procedure with side and site, patient position, and as applicable the availability of implants/special equipment/special requirements.  Patient on coagulant treatment: no  Timeout performed at: 12/24/2023 5:27 AM  Block Placement  Patient position: sitting  Prep: Betadine  Sterility prep: cap, drape, gloves, hand and mask  Sedation level: no sedation  Patient monitoring: blood pressure, continuous pulse oximetry and heart rate  Approach: midline  Local numbing: lidocaine 1% to skin and subcutaneous tissues  Vertebral space: lumbar  Lumbar location: L2-L3  Epidural  Loss of resistance technique: saline  Guidance: landmark technique        Needle  Needle type: Tuohy   Needle gauge: 17  Needle length: 9 cm  Needle insertion depth: 6 cm  Catheter type: multi-orifice  Catheter size: 19 G  Catheter at skin depth: 13 cm  Catheter securement method: clear occlusive dressing and surgical tape    Test dose: lidocaine 1.5% with epinephrine 1-to-200,000  Test dose given at 12/24/2023 5:39 AM  Test dose: lidocaine 1.5% with epinephrine 1-to-200,000  Test dose result: no positive test dose    PCEA  Medication concentration used: 0.1% Bupivacaine with 2 mcg/mL Fentanyl  Dose (mL): 5  Lockout (minutes): 20  1-Hour Limit (boluses/hr): 3  Basal Rate: 14        Assessment  Sensory level: T6 bilateral  Block outcome: patient comfortable  Number of attempts: 1  Events: no  positive test dose  Procedure assessment: patient tolerated procedure well with no immediate complications  Additional Notes   10:30  on 12/24  ;  Pt evaluated for pain. Epidural appears intact. 10cc  of 0.25% marcaine given via epidural catheter. Pt. Re evaluated by dr munoz and found to be 9 cm dialated.

## 2023-12-25 PROCEDURE — 2500000001 HC RX 250 WO HCPCS SELF ADMINISTERED DRUGS (ALT 637 FOR MEDICARE OP): Performed by: OBSTETRICS & GYNECOLOGY

## 2023-12-25 PROCEDURE — 1120000001 HC OB PRIVATE ROOM DAILY

## 2023-12-25 RX ADMIN — ACETAMINOPHEN 975 MG: 325 TABLET ORAL at 11:56

## 2023-12-25 RX ADMIN — ACETAMINOPHEN 975 MG: 325 TABLET ORAL at 18:03

## 2023-12-25 RX ADMIN — IBUPROFEN 600 MG: 600 TABLET, FILM COATED ORAL at 06:03

## 2023-12-25 RX ADMIN — IBUPROFEN 600 MG: 600 TABLET, FILM COATED ORAL at 18:03

## 2023-12-25 RX ADMIN — ACETAMINOPHEN 975 MG: 325 TABLET ORAL at 06:03

## 2023-12-25 RX ADMIN — IBUPROFEN 600 MG: 600 TABLET, FILM COATED ORAL at 11:56

## 2023-12-25 ASSESSMENT — PAIN SCALES - GENERAL
PAINLEVEL_OUTOF10: 3
PAINLEVEL_OUTOF10: 0 - NO PAIN
PAINLEVEL_OUTOF10: 3
PAINLEVEL_OUTOF10: 0 - NO PAIN
PAINLEVEL_OUTOF10: 0 - NO PAIN
PAINLEVEL_OUTOF10: 3

## 2023-12-25 NOTE — PROGRESS NOTES
Postpartum Progress Note    Assessment/Plan   Alison Smith is a 28 y.o., , s/p induction and  at 37w0d gestation for gestational HTN and is now postpartum day 1.  BP's in normal range in last 24 hours.   Continue postpartum care.    Subjective   Feeling well.   No complaints.     Objective     Recent /64    BP Min/Max Last 24 Hours:  BP  Min: 114/65  Max: 126/72    Physical Exam:  General: Examination reveals a well developed, well nourished, female, in no acute distress. She is alert and cooperative.  Abdomen: soft, non tender, non distended. Fundus firm, non tender, below umbilicus.  Ext: No erythema, no tenderness.     Steph Richards MD

## 2023-12-25 NOTE — ANESTHESIA POSTPROCEDURE EVALUATION
Patient: Alison Smith    Procedure Summary       Date: 12/24/23 Room / Location:     Anesthesia Start: 0525 Anesthesia Stop: 1345    Procedure: Labor Analgesia Diagnosis:     Scheduled Providers:  Responsible Provider: DIYA White    Anesthesia Type: epidural ASA Status: 3            Anesthesia Type: epidural    Vitals Value Taken Time   BP  12/24/23 2038   Temp  12/24/23 2038   Pulse  12/24/23 2038   Resp  12/24/23 2038   SpO2  12/24/23 2038       Anesthesia Post Evaluation    Patient location during evaluation: bedside  Patient participation: complete - patient participated  Level of consciousness: awake and alert  Pain score: 2  Pain management: adequate  Multimodal analgesia pain management approach  Airway patency: patent  Two or more strategies used to mitigate risk of obstructive sleep apnea  Cardiovascular status: acceptable  Respiratory status: acceptable  Hydration status: acceptable  Postoperative Nausea and Vomiting: none        There were no known notable events for this encounter.

## 2023-12-26 ENCOUNTER — APPOINTMENT (OUTPATIENT)
Dept: OBSTETRICS AND GYNECOLOGY | Facility: CLINIC | Age: 28
End: 2023-12-26
Payer: COMMERCIAL

## 2023-12-26 VITALS
DIASTOLIC BLOOD PRESSURE: 72 MMHG | WEIGHT: 234.79 LBS | HEART RATE: 85 BPM | BODY MASS INDEX: 44.36 KG/M2 | RESPIRATION RATE: 18 BRPM | SYSTOLIC BLOOD PRESSURE: 126 MMHG | TEMPERATURE: 98 F | OXYGEN SATURATION: 97 %

## 2023-12-26 PROBLEM — Z13.79 GENETIC SCREENING: Status: RESOLVED | Noted: 2023-10-09 | Resolved: 2023-12-26

## 2023-12-26 PROBLEM — O35.9XX0 SUSPECTED FETAL ANOMALY, ANTEPARTUM (HHS-HCC): Status: RESOLVED | Noted: 2023-09-26 | Resolved: 2023-12-26

## 2023-12-26 PROBLEM — Z3A.26 26 WEEKS GESTATION OF PREGNANCY (HHS-HCC): Status: RESOLVED | Noted: 2023-10-09 | Resolved: 2023-12-26

## 2023-12-26 PROBLEM — R09.81 SINUS CONGESTION: Status: RESOLVED | Noted: 2023-02-25 | Resolved: 2023-12-26

## 2023-12-26 PROBLEM — R53.83 FATIGUE: Status: RESOLVED | Noted: 2023-02-25 | Resolved: 2023-12-26

## 2023-12-26 PROBLEM — O26.899: Status: RESOLVED | Noted: 2023-10-09 | Resolved: 2023-12-26

## 2023-12-26 PROBLEM — O99.340 ANXIETY DURING PREGNANCY (HHS-HCC): Status: RESOLVED | Noted: 2023-10-09 | Resolved: 2023-12-26

## 2023-12-26 PROBLEM — Z12.4 PAPANICOLAOU SMEAR FOR CERVICAL CANCER SCREENING: Status: RESOLVED | Noted: 2023-03-24 | Resolved: 2023-12-26

## 2023-12-26 PROBLEM — R10.30: Status: RESOLVED | Noted: 2023-10-09 | Resolved: 2023-12-26

## 2023-12-26 PROBLEM — O21.9 NAUSEA AND VOMITING IN PREGNANCY (HHS-HCC): Status: RESOLVED | Noted: 2023-09-26 | Resolved: 2023-12-26

## 2023-12-26 PROBLEM — F41.9 ANXIETY DURING PREGNANCY (HHS-HCC): Status: RESOLVED | Noted: 2023-10-09 | Resolved: 2023-12-26

## 2023-12-26 PROBLEM — O99.210 OBESITY IN PREGNANCY (HHS-HCC): Status: RESOLVED | Noted: 2023-09-26 | Resolved: 2023-12-26

## 2023-12-26 PROCEDURE — 2500000001 HC RX 250 WO HCPCS SELF ADMINISTERED DRUGS (ALT 637 FOR MEDICARE OP): Performed by: OBSTETRICS & GYNECOLOGY

## 2023-12-26 RX ADMIN — ACETAMINOPHEN 975 MG: 325 TABLET ORAL at 00:05

## 2023-12-26 RX ADMIN — IBUPROFEN 600 MG: 600 TABLET, FILM COATED ORAL at 08:06

## 2023-12-26 RX ADMIN — IBUPROFEN 600 MG: 600 TABLET, FILM COATED ORAL at 00:05

## 2023-12-26 RX ADMIN — ACETAMINOPHEN 975 MG: 325 TABLET ORAL at 08:06

## 2023-12-26 ASSESSMENT — PAIN SCALES - GENERAL
PAINLEVEL_OUTOF10: 2
PAINLEVEL_OUTOF10: 0 - NO PAIN

## 2023-12-26 NOTE — LACTATION NOTE
Lactation Consultant Note  Lactation Consultation  Reason for Consult: Follow-up assessment  Consultant Name: Nevaeh Mejia IBCLC    Maternal Information  Has mother  before?: No  Infant to breast within first 2 hours of birth?: No  Exclusive Pump and Bottle Feed: Yes    Maternal Assessment       Infant Assessment       Feeding Assessment  Feeding Method: Paced bottle    LATCH TOOL       Breast Pump  Pump: Hospital grade electric pump  Frequency: Less than 3 times per day  Units of Volume: Drops    Other OB Lactation Tools       Patient Follow-up  Inpatient Lactation Follow-up Needed : No  Lactation Professional - OK to Discharge: Yes    Other OB Lactation Documentation       Recommendations/Summary   exclusively pumping and formula feeding mother and infant preparing for discharge. Mother states that she didn't pump at all yesterday because she was tired and sleeping a lot.  reviewed with mother that pumping will need to be every 2-3 hours around the clock if she desires a full milk supply.Reveiwed supply and demand. Mother verbalized understanding. Reviewed milk storage guidelines and operating the pump. Mother verbalized understanding and states she has no questions regarding pumping for infant and realizes she needs to pump regularly.  See education tab for education covered.

## 2023-12-26 NOTE — CARE PLAN
Problem: Hypertensive Disorder of Pregnancy (HDP)  Goal: Minimal s/sx of HDP and BP<160/110  Outcome: Met  Goal: Adequate urine output (0.5 ml/kg/hr)  Outcome: Met     Problem: Pain - Adult  Goal: Verbalizes/displays adequate comfort level or baseline comfort level  Outcome: Met     Problem: Safety - Adult  Goal: Free from fall injury  Outcome: Met     Problem: Discharge Planning  Goal: Discharge to home or other facility with appropriate resources  Outcome: Met

## 2023-12-26 NOTE — DISCHARGE SUMMARY
Discharge Summary    Admission Date: 12/23/2023  Discharge Date: 12/26/23    Discharge Diagnosis  Gestational hypertension, third trimester    Hospital Course  Delivery Date: 12/24/2023  1:45 PM   Delivery type: Vaginal, Spontaneous    GA at delivery: 37w0d  Outcome: Living   Anesthesia during delivery: Epidural   Intrapartum complications: None   Feeding method: Breastfeeding Status: Unknown     Procedures: none    Pertinent Physical Exam At Time of Discharge  EXTREMITIES: no redness or tenderness in the calves or thighs, edema 1+      Discharge Meds     Your medication list        CONTINUE taking these medications        Instructions Last Dose Given Next Dose Due   ferrous sulfate (325 mg ferrous sulfate) tablet           PNV no.163-iron-folate no.10 20 mg iron- 1 mg tablet                  STOP taking these medications      melatonin-pyridoxine (vit B6) 5-1 mg tablet        metoclopramide 10 mg tablet  Commonly known as: Reglan        Unisom (doxylamine) 25 mg tablet  Generic drug: doxylamine                  Complications Requiring Follow-Up  BP check in the office this week  Patient given instructions to check BP    Test Results Pending At Discharge  Pending Labs       Order Current Status    Surgical Pathology Exam - PLAC[facility]A In process            Outpatient Follow-Up  Future Appointments   Date Time Provider Department Center   12/26/2023 12:30 PM Cynthia Delgado DO [id number]   1/4/2024 12:30 PM Steph Richards MD [id number]   Schedule BP check this week and 6 week postpartum check.      Shyla Hagen MD

## 2023-12-26 NOTE — PROGRESS NOTES
Postpartum Progress Note    Assessment/Plan   Alison Smith is a 28 y.o., , who delivered at 37w0d gestation and is now postpartum day 2.  -Bps have been normal for almost 48 hours. Patient may want to go home later today. She already has a BP cuff at home. She will be given an instruction sheet. She would follow up within one week for BP check.  -Patient is pumping only.  -Continue vulvar care.    Principal Problem:    Gestational hypertension, third trimester  Active Problems:     (normal spontaneous vaginal delivery)    Pregnancy Problems (from 23 to present)       Problem Noted Resolved    Gestational hypertension, third trimester 2023 by Cielo Shirley, APRN-CNP No    Priority:  Medium      26 weeks gestation of pregnancy 10/9/2023 by Jung Sol MD No    Priority:  Medium      Anxiety during pregnancy 10/9/2023 by Jung Sol MD No    Priority:  Medium      Pregnancy with abdominal cramping of lower quadrant, antepartum 10/9/2023 by Jung Sol MD No    Priority:  Medium            Hospital course: gestational hypertension  Vaginal Birth   The patient's blood type is A POS.  Rhogam is not indicated.    Subjective   Patient without complaints this morning. Bottle feeding. She denies H/A, visual changes, RUQ pain.          Objective   Allergies:   Amoxicillin, Penicillin, and Penicillins         Last Vitals:  Temp Pulse Resp BP MAP Pulse Ox   36.6 °C (97.8 °F) 79 16 137/65   98 %     Vitals Min/Max Last 24 Hours:  Temp  Min: 36.6 °C (97.8 °F)  Max: 36.8 °C (98.2 °F)  Pulse  Min: 73  Max: 84  Resp  Min: 16  Max: 18  BP  Min: 108/55  Max: 137/65    Intake/Output:   No intake or output data in the 24 hours ending 23 0847    Physical Exam:  General: Examination reveals a well developed, well nourished, female, in no acute distress. She is alert and cooperative.  Psychological: awake and alert; oriented to person, place, and time.  Abdomen: soft, non tender, non  distended. Fundus firm, non tender, below umbilicus.  Ext: non tender, 1+ edema bilat.    Lab Data:  Lab Results   Component Value Date    WBC 12.9 (H) 12/23/2023    HGB 10.8 (L) 12/23/2023    HCT 32.6 (L) 12/23/2023     12/23/2023     Lab Results   Component Value Date    GLUCOSE 89 12/23/2023     12/23/2023    K 3.8 12/23/2023     12/23/2023    CO2 21 12/23/2023    ANIONGAP 14 12/23/2023    BUN 12 12/23/2023    CREATININE 0.53 12/23/2023    EGFR >90 12/23/2023    CALCIUM 8.8 12/23/2023    ALBUMIN 3.6 12/23/2023    PROT 6.6 12/23/2023    ALKPHOS 95 12/23/2023    ALT 7 12/23/2023    AST 12 12/23/2023    BILITOT 0.3 12/23/2023

## 2023-12-28 LAB
LABORATORY COMMENT REPORT: NORMAL
PATH REPORT.FINAL DX SPEC: NORMAL
PATH REPORT.GROSS SPEC: NORMAL
PATH REPORT.RELEVANT HX SPEC: NORMAL
PATH REPORT.TOTAL CANCER: NORMAL

## 2023-12-29 ENCOUNTER — POSTPARTUM VISIT (OUTPATIENT)
Dept: OBSTETRICS AND GYNECOLOGY | Facility: CLINIC | Age: 28
End: 2023-12-29
Payer: COMMERCIAL

## 2023-12-29 ENCOUNTER — APPOINTMENT (OUTPATIENT)
Dept: OBSTETRICS AND GYNECOLOGY | Facility: CLINIC | Age: 28
End: 2023-12-29
Payer: COMMERCIAL

## 2023-12-29 ENCOUNTER — TELEPHONE (OUTPATIENT)
Dept: OBSTETRICS AND GYNECOLOGY | Facility: HOSPITAL | Age: 28
End: 2023-12-29

## 2023-12-29 VITALS
DIASTOLIC BLOOD PRESSURE: 80 MMHG | BODY MASS INDEX: 42.1 KG/M2 | HEIGHT: 61 IN | SYSTOLIC BLOOD PRESSURE: 118 MMHG | WEIGHT: 223 LBS

## 2023-12-29 DIAGNOSIS — Z01.30 BP CHECK: ICD-10-CM

## 2023-12-29 PROCEDURE — 0503F POSTPARTUM CARE VISIT: CPT | Performed by: NURSE PRACTITIONER

## 2023-12-29 NOTE — PROGRESS NOTES
Postpartum Progress Note    Assessment/Plan   Alison Smith is a 28 y.o. , who delivered at 37w0d gestation and is now postpartum day 5.  She presents for BP check today.  Pregnancy c/b gestational HTN.  She had uncomplicated,  on .   BP's normal in hospital. Discharged home .   Checking BP's at home which have been normal 120-130/70-80 per patient.  BP normotensive today.   Denies HA's, vision changes, CP, SOB.  Reports mild intermittent cramping. Taking Tylenol as needed.  Bleeding is light.  She is bottle feeding.  Reports adequate support at home.     Active Problems:  There are no active Hospital Problems.    Pregnancy Problems (from 23 to present)       Problem Noted Resolved    Gestational hypertension, third trimester 2023 by CELE Wallace-CNP No    Priority:  Medium      26 weeks gestation of pregnancy 10/9/2023 by Jung Sol MD 2023 by Shyla Hagen MD    Priority:  Medium      Anxiety during pregnancy 10/9/2023 by Jung Sol MD 2023 by Shyla Hagen MD    Priority:  Medium      Pregnancy with abdominal cramping of lower quadrant, antepartum 10/9/2023 by Jung Sol MD 2023 by Shyla Hagen MD    Priority:  Medium              Subjective   Her pain is well controlled with current medications  She is ambulating well  She is tolerating a regular diet.   She denies emotional concerns and reports no thoughts of harming self or baby(ies) today.  Her plan for contraception is uncertain.     Objective   Allergies:   Amoxicillin and Penicillin         Last Vitals:  Temp Pulse Resp BP MAP Pulse Ox         118/80         Physical Exam:  General: Examination reveals a well developed, well nourished, female, in no acute distress. She is alert and cooperative.  Lungs: clear to auscultation bilaterally.  Cardiac: regular rate and rhythm, S1, S2 normal, no murmur, click, rub or gallop.  Abdomen: soft, gravid, nontender, nondistended, no  abnormal masses, no epigastric pain.  Fundus: below umbilicus.  Psychological: awake and alert; oriented to person, place, and time.    1. Gestational hypertension without significant proteinuria, postpartum  2. BP check  -BP normotensive today.  -Monitoring BP's at home, have been normal.  -Continue home monitoring. Call for any severe BP's or any abnormal signs/symptoms.  -Follow up routine 6 week postpartum visit or sooner if needed.

## 2023-12-29 NOTE — PROGRESS NOTES
Follow-Up Note    Care Type: Women's Health  Phone Number Called: [phone]    Call Outcome (connected, left message, no answer, phone disconnected): connected  Patient reports feeling symptoms are (better, worse, same): better    After returning home from the hospital, was it clear to you:     Which Meds were new Meds? No new meds  Which Meds to continue? yes  Which Meds to stop? N/A  Who participated in medication reconciliation with the hospital staff (patient, family, other, no one): patient    To be answered by care coordinator or staff member doing call back:     In your professional opinion, do you think there was a medication discrepancy or potential for medication discrepancy in this situation? no    Medication issues (none, confusion which medications to take, non-adherence to medication, prescription unfilled-inadequate funds, prescription unfilled-pharmacy will not fill (prescription unfilled-unable to get to pharmacy, troubled by side effects, other-see comments): none    Discharge Instructions were clear: yes    Patient has a primary care provider: yes  Post-hospital follow-up occurred according to schedule: yes  Reason (appointment scheduled in future, appointment was never scheduled-encouraged patient to call, no appointment available within discharge plan, patient missed appointment):     Delivered baby(ies): yes    Chest Pain?: no  SOB or difficulty breathing?:  no  Seizures?: no    Any thoughts of hurting yourself or your baby?: no  Bleeding that is soaking through one pad/hour or blood clots the size of an egg or larger?: no  Incision that is not healing? no    Red or swollen leg that is painful or warm to the touch?  no  Temperature of 100.4*F or higher?: no  Headache that does not get better, even after taking medicine, or a bad headache with vision changes?: no    Where or in what is your baby sleeping? bassinet in patient's room     ABC's of sleep covered? yes    How are you feeding your  baby(ies)-breast, EBM, formula, supplementation?: formula  Baby getting anything other than breastmilk or formula for food? no    Patient has Primary Care Provider for baby(ies)? yes  Baby has been seen by a health care provider since discharge? yes  If no, reason (appointment scheduled in the future, appointment was never scheduled, no appointment available within discharge plan, patient missed appointment):     Mom's Discharge Date: 12/24/2023  Date Baby was seen by provider: 12/27/2023    Patient has specific name and number of who to call for concerns?: yes    Date/Time of Call: 12/29/2023 @ 1643  Call back done by (care coordinator, relationship based nurse, patient returned call, , lactation consultant, manager/supervisor, patient navigator, social work, other-see comments): relationship based nurse      Comments:               Attempt Date 1: 12/29/2023  Call Attempt 1 outcome: Connected with patient. Follow-Up call successful.      Attempt Date 2:   Call Attempt 2 outcome:

## 2024-01-04 ENCOUNTER — APPOINTMENT (OUTPATIENT)
Dept: OBSTETRICS AND GYNECOLOGY | Facility: CLINIC | Age: 29
End: 2024-01-04
Payer: COMMERCIAL

## 2024-01-16 ENCOUNTER — TELEPHONE (OUTPATIENT)
Dept: OBSTETRICS AND GYNECOLOGY | Facility: CLINIC | Age: 29
End: 2024-01-16
Payer: COMMERCIAL

## 2024-01-16 NOTE — TELEPHONE ENCOUNTER
----- Message from Alison Smith sent at 1/16/2024 12:45 PM EST -----  Regarding: Postpartum check up  Contact: [phone]  Hi Dr. Morillo  I called the office the other day to try and move my appointment up with Dr. Richards because my leave is up on 2/3/24 and my appointment is on 2/7/24. I’m not comfortable going back to work without being checked/ cleared. I was also wanting to inquire about possibly extending my leave.   I spoke with Kristen and she said there was nothing she could do about my appointment.      Is there anyway you could help?       Cayden Vang is a 52 year old male presenting with a fall backwards last week onto his back and has been having right shoulder and arm pains since then.     Patient would like communication of results via:   [] Michael   [] Home Phone: [phone] (home)   [] Work Phone:  [phone] (work)    [x] Cell Phone:    [phone] (mobile)        Emergency Contact: ROHAN VANG    [] Home:    [phone]     [] Work:     [id number]    [] Mobile:   [phone]         Emergency Contact: ROHAN VANG    [] Home:   [phone]    [] Work:    [id number]     [] Mobile:  [phone]    [] Letter  Okay to leave message containing results? Yes     Social History     Tobacco Use   Smoking Status Every Day   • Packs/day: 0.50   • Years: 33.00   • Pack years: 16.50   • Types: Cigarettes   • Start date: 1/1/1987   Smokeless Tobacco Never   Tobacco Comments    started smoking age 18      Denies known Latex allergy or symptoms of Latex sensitivity.;       No

## 2024-01-30 ENCOUNTER — POSTPARTUM VISIT (OUTPATIENT)
Dept: OBSTETRICS AND GYNECOLOGY | Facility: CLINIC | Age: 29
End: 2024-01-30
Payer: COMMERCIAL

## 2024-01-30 VITALS
WEIGHT: 209 LBS | DIASTOLIC BLOOD PRESSURE: 70 MMHG | BODY MASS INDEX: 38.46 KG/M2 | SYSTOLIC BLOOD PRESSURE: 126 MMHG | HEIGHT: 62 IN

## 2024-01-30 DIAGNOSIS — Z12.4 ROUTINE CERVICAL SMEAR: ICD-10-CM

## 2024-01-30 PROCEDURE — 0503F POSTPARTUM CARE VISIT: CPT | Performed by: OBSTETRICS & GYNECOLOGY

## 2024-01-30 PROCEDURE — 87624 HPV HI-RISK TYP POOLED RSLT: CPT

## 2024-01-30 PROCEDURE — 88175 CYTOPATH C/V AUTO FLUID REDO: CPT

## 2024-01-30 RX ORDER — ESCITALOPRAM OXALATE 10 MG/1
10 TABLET ORAL DAILY
Qty: 30 TABLET | Refills: 2 | Status: SHIPPED | OUTPATIENT
Start: 2024-01-30 | End: 2025-01-29

## 2024-01-30 ASSESSMENT — EDINBURGH POSTNATAL DEPRESSION SCALE (EPDS)
I HAVE BEEN SO UNHAPPY THAT I HAVE BEEN CRYING: YES, MOST OF THE TIME
THE THOUGHT OF HARMING MYSELF HAS OCCURRED TO ME: NEVER
I HAVE FELT SCARED OR PANICKY FOR NO GOOD REASON: YES, QUITE A LOT
I HAVE BEEN SO UNHAPPY THAT I HAVE HAD DIFFICULTY SLEEPING: YES, SOMETIMES
I HAVE FELT SAD OR MISERABLE: YES, QUITE OFTEN
THINGS HAVE BEEN GETTING ON TOP OF ME: YES, SOMETIMES I HAVEN'T BEEN COPING AS WELL AS USUAL
I HAVE LOOKED FORWARD WITH ENJOYMENT TO THINGS: RATHER LESS THAN I USED TO
I HAVE BEEN ABLE TO LAUGH AND SEE THE FUNNY SIDE OF THINGS: NOT QUITE SO MUCH NOW
I HAVE BLAMED MYSELF UNNECESSARILY WHEN THINGS WENT WRONG: YES, MOST OF THE TIME
I HAVE BEEN ANXIOUS OR WORRIED FOR NO GOOD REASON: YES, VERY OFTEN
TOTAL SCORE: 20

## 2024-01-30 NOTE — PROGRESS NOTES
"Pap: 3- NEG, HPV POS other    - PAIN ABOVE BELLY BUTTON - TRIED ACID REFLUX MEDS BUT DID NOT WORK AND VOMITING       ASSESSMENT/PLAN    Postpartum depression.   Signs and symptoms of worsening depression reviewed.   Recommend start escitalopram 10 mg . Rx sent to pharmacy.   Contact counselor today.   Extend postpartum leave additional two weeks. Pt to provide work paperwork for this. Plan for return to work date 2024.   Pt to followup with me by phone 1 week, sooner if needed. Emphasized physicians always available if needed.     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 If you are connected with the  on line system, you will be notified about your pap results through the patient portal.     SUBJECTIVE    HPI    29 yo  s/p induction of labor for gestational HTN, s/p .   Pt bottle feeding.   Currently baby had GI issue/virus. Irritable with diarrhea. Saw Peds.  Depression score 20/30.   Pt notes symptoms of depression, tearfulness, feeling down. Denies thoughts of hurting self or others. Has remote h/o depression and was on meds in the past. Unsure of what meds. Feels like she needs to be on something to help currently. Has no appetite. Difficulty sleeping. Has seen a counselor in the past and was going to reach out to her today.   Has support from partner but he works third shift. We discussed strategies to allow her time away from the baby, having her partner help her.   Pt supposed to go back to work this coming Monday. Does not feel that she can cope with that. Asks if she can have more time, see a counselor, start antidepressant meds.     Discussed contraception. Plans condoms for now.   No menses yet. Had some brighter red bleeding today. Her postpartum bleeding had stopped.     OBJECTIVE  /70   Ht 1.575 m (5' 2\")   Wt 94.8 kg (209 lb)   LMP 2023   Breastfeeding No   BMI 38.23 kg/m²      Physical Exam     Constitutional: Alert and in no acute distress. Well " developed, well nourished   Head and Face: Head and face: normal   Eyes: Normal external exam - nonicteric sclera, extraocular movements intact (EOMI) and no ptosis.   Ears, Nose, Mouth, and Throat: External inspection of ears and nose: normal   Neck: no neck asymmetry. Supple and thyroid not enlarged and there were no palpable thyroid nodules   Cardiovascular: Heart rate and rhythm were normal, normal S1 and S2, no gallops, and no murmurs   Pulmonary: No respiratory distress and clear bilateral breath sounds   Chest: Breasts: normal appearance, no nipple discharge and no skin changes and palpation of breasts and axillae: no palpable mass and no axillary lymphadenopathy   Abdomen: soft nontender; no abdominal mass palpated, no organomegaly and no hernias   Genitourinary: external genitalia: normal, no inguinal lymphadenopathy, Bartholin's urethral and [place]'s glands: normal, urethra: normal, bladder: normal on palpation and perianal area: normal   Vagina: normal.   Cervix: Normal appearing without lesions.  Uterus: Normal, mobile, nontender.  Right Adnexa/parametria: Normal.   Left Adnexa/parametria: Normal.   Skin: normal skin color and pigmentation, normal skin turgor, and no rash.   Psychiatric: alert and oriented x 3., affect normal to patient baseline and mood: appropriate          Steph Richards MD

## 2024-01-30 NOTE — PATIENT INSTRUCTIONS
Postpartum Visit- 6 week follow up:  You were seen for a routine postpartum visit with normal findings on exam  Continue routine postpartum precautions at home  Your vaginal laceration is fully healed on exam today  You may return to all activities at this time  Your pap smear history was reviewed today and since your last pap in 2023 was normal but HPV+ a pap and HPV test was done.   It is advised to avoid conception for at least 12 months following delivery. If you have declined medical contraception, use condoms to protect against unplanned/short interval pregnancy.  We discussed your symptoms of postpartum depression and anxiety. I recommend you contact your therapist today and reestablish care with them. I recommend you start escitalopram 10 mg daily. I sent an rx to your pharmacy. We reviewed signs and symptoms of worsening depressive symptoms and you should seek help immediately for these.   Please provide us with paperwork to extend your leave by to weeks (return to work date 2/19/2024) based on your current diagnosis of postpartum depression and anxiety.  Please call the office with any gynecologic or breast concerns in the next year. [phone] ([place]) or [phone] ([place])

## 2024-02-07 ENCOUNTER — APPOINTMENT (OUTPATIENT)
Dept: OBSTETRICS AND GYNECOLOGY | Facility: CLINIC | Age: 29
End: 2024-02-07
Payer: COMMERCIAL

## 2024-02-12 LAB
CYTOLOGY CMNT CVX/VAG CYTO-IMP: NORMAL
HPV HR 12 DNA GENITAL QL NAA+PROBE: POSITIVE
HPV HR GENOTYPES PNL CVX NAA+PROBE: POSITIVE
HPV16 DNA SPEC QL NAA+PROBE: NEGATIVE
HPV18 DNA SPEC QL NAA+PROBE: NEGATIVE
LAB AP HPV GENOTYPE QUESTION: YES
LAB AP HPV HR: NORMAL
LABORATORY COMMENT REPORT: NORMAL
PATH REPORT.TOTAL CANCER: NORMAL

## 2024-03-18 ENCOUNTER — PROCEDURE VISIT (OUTPATIENT)
Dept: OBSTETRICS AND GYNECOLOGY | Facility: CLINIC | Age: 29
End: 2024-03-18
Payer: COMMERCIAL

## 2024-03-18 VITALS — BODY MASS INDEX: 38.41 KG/M2 | SYSTOLIC BLOOD PRESSURE: 120 MMHG | WEIGHT: 210 LBS | DIASTOLIC BLOOD PRESSURE: 60 MMHG

## 2024-03-18 DIAGNOSIS — N72 HIGH RISK HUMAN PAPILLOMA VIRUS (HPV) INFECTION OF CERVIX: ICD-10-CM

## 2024-03-18 DIAGNOSIS — B97.7 HIGH RISK HUMAN PAPILLOMA VIRUS (HPV) INFECTION OF CERVIX: ICD-10-CM

## 2024-03-18 DIAGNOSIS — Z32.02 PREGNANCY TEST NEGATIVE: Primary | ICD-10-CM

## 2024-03-18 LAB — PREGNANCY TEST URINE, POC: NEGATIVE

## 2024-03-18 PROCEDURE — 88305 TISSUE EXAM BY PATHOLOGIST: CPT | Performed by: PATHOLOGY

## 2024-03-18 PROCEDURE — 88305 TISSUE EXAM BY PATHOLOGIST: CPT

## 2024-03-18 PROCEDURE — 57456 ENDOCERV CURETTAGE W/SCOPE: CPT | Performed by: OBSTETRICS & GYNECOLOGY

## 2024-03-18 PROCEDURE — 81025 URINE PREGNANCY TEST: CPT | Performed by: OBSTETRICS & GYNECOLOGY

## 2024-03-18 NOTE — PATIENT INSTRUCTIONS
Visit for Colposcopy:  You were seen today for colposcopy for abnormal pap smear  Colposcopy is a type of examination that allows for better visualization of abnormal cervical cells, generally caused by infection with Human Papilloma Virus (HPV)  You may have had a biopsy done to better diagnose cervical abnormalities and plan for future management. If so, you may have some spotting to light vaginal bleeding for a few days. Sometimes a medication called Monsel's is used after cervical biopsies to stop bleeding, and this medication often causes a discharge that looks like coffee grounds  If a biopsy was done during your colposcopy you will receive your results by phone/MyChart  Recommendations for follow up pap smears/treatment is dependent on colposcopy/biopsy findings  Monitor for signs of infection and call the office for any fever, chills, severe/worsening pelvic pain, foul smelling vaginal discharge. [phone] [place] [phone]

## 2024-03-18 NOTE — PROGRESS NOTES
29 yo with normal pap, HPV + x 2 for colposcopy.  Baby doing well.   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       Imp/Plan  Normal pap, HPV + other x 2.   Normal colposcopy.  Endocervical curettage done.    Results by phone.    Colposcopy    Date/Time: 3/18/2024 2:34 PM    Performed by: Steph Richards MD  Authorized by: Steph Richards MD    Procedure location: cervix    Consent:     Patient questions answered: yes      Risks and benefits of the procedure and its alternatives discussed: yes      Consent obtained:  Written    Consent given by:  Patient  Pre-procedure:     Speculum was placed in the vagina: yes      Prep solution(s): acetic acid    Procedure:     Colposcopy with: endocervical curettage      Colposcopy details:  Normal appearing cervix, ectocervix.    Cervix visibility: fully visualized      SCJ visibility: fully visualized      Cervical impression: normal/benign    Post-procedure:     Patient tolerance of procedure:  Patient tolerated the procedure well with no immediate complications    Instructions and paperwork completed: yes       Steph Richards MD

## 2024-07-12 ENCOUNTER — APPOINTMENT (OUTPATIENT)
Dept: PRIMARY CARE | Facility: CLINIC | Age: 29
End: 2024-07-12
Payer: COMMERCIAL

## 2024-07-12 VITALS
OXYGEN SATURATION: 99 % | TEMPERATURE: 97.4 F | HEART RATE: 100 BPM | BODY MASS INDEX: 38.72 KG/M2 | WEIGHT: 210.4 LBS | SYSTOLIC BLOOD PRESSURE: 112 MMHG | DIASTOLIC BLOOD PRESSURE: 68 MMHG | HEIGHT: 62 IN

## 2024-07-12 DIAGNOSIS — E55.9 VITAMIN D INSUFFICIENCY: ICD-10-CM

## 2024-07-12 DIAGNOSIS — E78.5 BORDERLINE HYPERLIPIDEMIA: ICD-10-CM

## 2024-07-12 DIAGNOSIS — D50.9 IRON DEFICIENCY ANEMIA, UNSPECIFIED IRON DEFICIENCY ANEMIA TYPE: ICD-10-CM

## 2024-07-12 DIAGNOSIS — F41.9 ANXIETY: ICD-10-CM

## 2024-07-12 DIAGNOSIS — R00.2 PALPITATIONS: Primary | ICD-10-CM

## 2024-07-12 DIAGNOSIS — F32.0 CURRENT MILD EPISODE OF MAJOR DEPRESSIVE DISORDER, UNSPECIFIED WHETHER RECURRENT (CMS-HCC): ICD-10-CM

## 2024-07-12 PROCEDURE — 1036F TOBACCO NON-USER: CPT | Performed by: PHYSICIAN ASSISTANT

## 2024-07-12 PROCEDURE — 3074F SYST BP LT 130 MM HG: CPT | Performed by: PHYSICIAN ASSISTANT

## 2024-07-12 PROCEDURE — 3078F DIAST BP <80 MM HG: CPT | Performed by: PHYSICIAN ASSISTANT

## 2024-07-12 PROCEDURE — 99204 OFFICE O/P NEW MOD 45 MIN: CPT | Performed by: PHYSICIAN ASSISTANT

## 2024-07-12 RX ORDER — BUPROPION HYDROCHLORIDE 150 MG/1
150 TABLET ORAL EVERY MORNING
Qty: 30 TABLET | Refills: 1 | Status: SHIPPED | OUTPATIENT
Start: 2024-07-12 | End: 2024-09-10

## 2024-07-12 RX ORDER — BUSPIRONE HYDROCHLORIDE 5 MG/1
5 TABLET ORAL 3 TIMES DAILY PRN
Qty: 90 TABLET | Refills: 11 | Status: SHIPPED | OUTPATIENT
Start: 2024-07-12

## 2024-07-12 ASSESSMENT — PATIENT HEALTH QUESTIONNAIRE - PHQ9
2. FEELING DOWN, DEPRESSED OR HOPELESS: NOT AT ALL
1. LITTLE INTEREST OR PLEASURE IN DOING THINGS: NOT AT ALL
SUM OF ALL RESPONSES TO PHQ9 QUESTIONS 1 AND 2: 0

## 2024-07-12 NOTE — PROGRESS NOTES
Subjective     HPI   Alison Smith is a 28 y.o. year old female patient with presenting to clinic with concern for   Chief Complaint   Patient presents with    New Patient Visit     Fx hx of myocardial infraction. Chiropractor recommends she see cardiology.     Med Management     Depression and anxiety. Was on lexapro did not like how it made her feel.        Alison presents to clinic as a new patient. Formerly seen by Dr Zabala.   Hx Gestational HTN    GYN- [facility]. Gave birth 12/23/23    VICTORIA 7= 9  PH9-9 =9    Family  history of Heart disease- Sister- age 31 MI. (Possible complications of leukemia)     Was on lexapro for postpartum depression- was on for 2.5months felt flat and still sad. Denies suicidal ideation    Had been on prozac? In the past and didn't feel her best either.     Patient Active Problem List   Diagnosis    Generalized anxiety disorder    Eczema    Id reaction    Bilateral carpal tunnel syndrome    Lactose intolerance    Multiple food allergies    Penicillin allergy    Gestational hypertension, third trimester ([facility])       Past Medical History:   Diagnosis Date    Acute maxillary sinusitis, unspecified 10/28/2020    Acute maxillary sinusitis, unspecified    Adjustment disorder with mixed anxiety and depressed mood 03/15/2017    Adjustment disorder with mixed anxiety and depressed mood    Allergic     Anxiety     Calculus of bile duct without cholangitis or cholecystitis without obstruction     Gall bladder pain    Contact with and (suspected) exposure to other viral communicable diseases 01/04/2022    Contact with or exposure to viral disease    Depression     Eczema     Exercise-induced asthma ([facility])     Fatigue, unspecified type     Generalized anxiety disorder     HPV (human papilloma virus) infection     Nausea and vomiting in pregnancy ([facility])     Noninfective gastroenteritis and colitis, unspecified 08/23/2022    Gastroenteritis, acute    Personal history of  "other specified conditions 03/15/2017    History of left flank pain    Varicella       Past Surgical History:   Procedure Laterality Date    NASAL ENDOSCOPY      OTHER SURGICAL HISTORY  10/22/2019    No history of surgery      Family History   Problem Relation Name Age of Onset    Drug abuse Mother Ashley Smith     No Known Problems Father      Hypertension Sister Graciela Smith     Leukemia Sister Graciela Smith     Heart attack Sister Graciela Smith     Cancer Sister Graciela Smith     Cancer Maternal Grandmother Luci [facility]     Diabetes Mother's Brother Vu [facility]       Social History     Tobacco Use    Smoking status: Never    Smokeless tobacco: Never   Substance Use Topics    Alcohol use: Not Currently     Comment: Socially        Current Outpatient Medications:     escitalopram (Lexapro) 10 mg tablet, Take 1 tablet (10 mg) by mouth once daily., Disp: 30 tablet, Rfl: 2    ferrous sulfate, 325 mg ferrous sulfate, tablet, Take 1 tablet by mouth 2 times a week., Disp: , Rfl:     PNV no.163-iron-folate no.10 20 mg iron- 1 mg tablet, Take by mouth., Disp: , Rfl:     buPROPion XL (Wellbutrin XL) 150 mg 24 hr tablet, Take 1 tablet (150 mg) by mouth once daily in the morning. Do not crush, chew, or split., Disp: 30 tablet, Rfl: 1    busPIRone (Buspar) 5 mg tablet, Take 1 tablet (5 mg) by mouth 3 times a day as needed (anxiety)., Disp: 90 tablet, Rfl: 11     Review of Systems  Constitutional: Denies fever  HEENT: Denies ST, earache  CVS: Denies Chest pain  Pulmonary: Denies wheezing, SOB  GI: Denies N/V  : Denies dysuria  Musculoskeletal:  Denies myalgia  Neuro: Denies focal weakness or numbness.  Skin: Denies Rashes.  *Review of Systems is negative unless otherwise mentioned in HPI or ROS above.    Objective   /68   Pulse 100   Temp 36.3 °C (97.4 °F)   Ht 1.575 m (5' 2\")   Wt 95.4 kg (210 lb 6.4 oz)   SpO2 99%   BMI 38.48 kg/m²  reviewed Body mass index is 38.48 kg/m².     Physical " Exam  Constitutional: NAD. Afebrile. Resting comfortably.  ENT: Nasal mucosa and oropharynx: moist oral mucosa. Posterior oropharynx nonerythematous. No posterior pharyngeal streaking.  TM: Bilat TM nonerythematous, pearly grey, landmarks intact. EAC wnl bilat.  Eyes: PERRLA. EOM intact.   Lymph: No anterior cervical chain or submandibular lymphadenopathy. No sentinel lymph nodes.  Cardiac: Regular rate & rhythm. No murmur, gallops, or rubs.  Pulmonary: Lungs clear to auscultation bilaterally with good aeration. No wheezes, rhonchi, or rales.   GI: Soft, Nontender, nondistended. No guarding. Normal BS x4.  : No suprapubic tenderness. No CVA tenderness to percussion.   Musculoskeletal: No peripheral edema.   Skin: No evidence of trauma. No rashes  Neuro: No focal neuro deficits. Normal gait without assistive devices.  Psych: Intact judgement and insight.      .Assessment/Plan   Problem List Items Addressed This Visit    None  Visit Diagnoses         Codes    Palpitations    -  Primary R00.2    Relevant Orders    Referral to Cardiology    Current mild episode of major depressive disorder, unspecified whether recurrent (CMS-HCC)     F32.0    Relevant Medications    buPROPion XL (Wellbutrin XL) 150 mg 24 hr tablet    Anxiety     F41.9    Relevant Medications    busPIRone (Buspar) 5 mg tablet    Borderline hyperlipidemia     E78.5    Relevant Orders    CBC    Comprehensive Metabolic Panel    TSH with reflex to Free T4 if abnormal    Lipid Panel    Vitamin D insufficiency     E55.9    Relevant Orders    Vitamin D 25-Hydroxy,Total (for eval of Vitamin D levels)    Iron deficiency anemia, unspecified iron deficiency anemia type     D50.9    Relevant Orders    Iron and TIBC

## 2024-08-08 ENCOUNTER — OFFICE VISIT (OUTPATIENT)
Dept: CARDIOLOGY | Facility: HOSPITAL | Age: 29
End: 2024-08-08
Payer: COMMERCIAL

## 2024-08-08 VITALS
WEIGHT: 207.45 LBS | OXYGEN SATURATION: 98 % | DIASTOLIC BLOOD PRESSURE: 72 MMHG | SYSTOLIC BLOOD PRESSURE: 118 MMHG | HEART RATE: 75 BPM | BODY MASS INDEX: 37.94 KG/M2

## 2024-08-08 DIAGNOSIS — R00.2 PALPITATIONS: ICD-10-CM

## 2024-08-08 LAB
ATRIAL RATE: 101 BPM
P AXIS: 56 DEGREES
P OFFSET: 188 MS
P ONSET: 142 MS
PR INTERVAL: 152 MS
Q ONSET: 218 MS
QRS COUNT: 17 BEATS
QRS DURATION: 84 MS
QT INTERVAL: 348 MS
QTC CALCULATION(BAZETT): 451 MS
QTC FREDERICIA: 414 MS
R AXIS: 57 DEGREES
T AXIS: 7 DEGREES
T OFFSET: 392 MS
VENTRICULAR RATE: 101 BPM

## 2024-08-08 PROCEDURE — 3074F SYST BP LT 130 MM HG: CPT | Performed by: NURSE PRACTITIONER

## 2024-08-08 PROCEDURE — 99204 OFFICE O/P NEW MOD 45 MIN: CPT | Performed by: NURSE PRACTITIONER

## 2024-08-08 PROCEDURE — 1036F TOBACCO NON-USER: CPT | Performed by: NURSE PRACTITIONER

## 2024-08-08 PROCEDURE — 93005 ELECTROCARDIOGRAM TRACING: CPT | Performed by: NURSE PRACTITIONER

## 2024-08-08 PROCEDURE — 99214 OFFICE O/P EST MOD 30 MIN: CPT | Performed by: NURSE PRACTITIONER

## 2024-08-08 PROCEDURE — 3078F DIAST BP <80 MM HG: CPT | Performed by: NURSE PRACTITIONER

## 2024-08-08 NOTE — PROGRESS NOTES
Referred by Dr. Mathew for cardiac evaluation, family hx. Of premature cardiac disease      History Of Present Illness:    Alison Smith is a 28 y.o. female with h/o exercise induced asthma, gestational htn, anxiety presenting today to the [facility] to establish cardiac care.  She reports having a strong family history of premature CAD-- maternal grandfather had MI at age of 36 requiring CABG, sister was diagnosed with AML at age 30yo with reported MI after initiation of chemotherapy.  Alison denies having symptoms of chest pain or pressure, SOB. Does have occasional palpitations that occur rarely and she typically associates with anxiety attacks.  Denies prior syncopal events. Denies LE edema or orthopnea.       Past Medical History:  She has a past medical history of Acute maxillary sinusitis, unspecified (10/28/2020), Adjustment disorder with mixed anxiety and depressed mood (03/15/2017), Allergic, Anxiety, Calculus of bile duct without cholangitis or cholecystitis without obstruction, Contact with and (suspected) exposure to other viral communicable diseases (01/04/2022), Depression, Eczema, Exercise-induced asthma ([facility]), Fatigue, unspecified type, Generalized anxiety disorder, HPV (human papilloma virus) infection, Nausea and vomiting in pregnancy ([facility]), Noninfective gastroenteritis and colitis, unspecified (08/23/2022), Personal history of other specified conditions (03/15/2017), and Varicella.    Past Surgical History:  She has a past surgical history that includes Other surgical history (10/22/2019) and Nasal endoscopy.      Social History:  She reports that she has never smoked. She has never used smokeless tobacco. She reports that she does not currently use alcohol. She reports that she does not use drugs.    Family History:  Family History   Problem Relation Name Age of Onset    Drug abuse Mother Ashley Smith     No Known Problems Father       Hypertension Sister Graciela Smith     Leukemia Sister Graciela Smith     Heart attack Sister Graciela Smith     Cancer Sister Graciela Smith     Cancer Maternal Grandmother Luci Epstein     Diabetes Mother's Brother Vu Epstein         Allergies:  Amoxicillin and Penicillin    Outpatient Medications:  Current Outpatient Medications   Medication Instructions    buPROPion XL (WELLBUTRIN XL) 150 mg, oral, Every morning, Do not crush, chew, or split.    busPIRone (BUSPAR) 5 mg, oral, 3 times daily PRN    ferrous sulfate (325 mg ferrous sulfate) 325 mg, oral, 2 times weekly    PNV no.163-iron-folate no.10 20 mg iron- 1 mg tablet oral        Last Recorded Vitals:  Vitals:    08/08/24 0920   BP: 118/72   Pulse: 75   SpO2: 98%   Weight: 94.1 kg (207 lb 7.3 oz)       Physical Exam:  Constitutional: Pleasant, Awake/Alert/Oriented to person place and time. No distress  Head: Atraumatic, Normocephalic  Eyes: EOMI. DAX  Neck:  No JVD  Cardiovascular: Regular rate and rhythm, S1, S2. No extra heart sounds or murmurs  Respiratory: Clear to auscultation bilaterally. No wheezing, rales or rhonchi. Good chest wall expansion  Abdomen: Soft, Nontender. Bowel sounds appreciated  Musculoskeletal: ROM intact. Muscle strength grossly intact upper and lower extremities 5/5.   Neurological: CNII-XII intact. Sensation grossly intact  Extremities: Warm and dry. No acute rashes and lesions  Psychiatric: Appropriate mood and affect         Last Labs:  CBC -  Lab Results   Component Value Date    WBC 12.9 (H) 12/23/2023    HGB 10.8 (L) 12/23/2023    HCT 32.6 (L) 12/23/2023    MCV 84 12/23/2023     12/23/2023       CMP -  Lab Results   Component Value Date    CALCIUM 8.8 12/23/2023    PROT 6.6 12/23/2023    ALBUMIN 3.6 12/23/2023    AST 12 12/23/2023    ALT 7 12/23/2023    ALKPHOS 95 12/23/2023    BILITOT 0.3 12/23/2023       LIPID PANEL -   Lab Results   Component Value Date    CHOL 173 10/21/2022    TRIG 66 10/21/2022    HDL 50 (L)  10/21/2022    CHHDL 3.5 10/21/2022       RENAL FUNCTION PANEL -   Lab Results   Component Value Date    GLUCOSE 89 12/23/2023     12/23/2023    K 3.8 12/23/2023     12/23/2023    CO2 21 12/23/2023    ANIONGAP 14 12/23/2023    BUN 12 12/23/2023    CREATININE 0.53 12/23/2023    CALCIUM 8.8 12/23/2023    ALBUMIN 3.6 12/23/2023        Lab Results   Component Value Date    HGBA1C 5.4 06/24/2023       Last Cardiology Tests:  ECG:  Sinus tachycardia, HR 101bpm       Lab review: I have personally reviewed the laboratory result(s)   Diagnostic review: I have personally reviewed the result(s) of the EKG .     Assessment/Plan   Very pleasant 28 y.o. female with h/o exercise induced asthma, gestational htn, anxiety presenting today to the [facility] to establish cardiac care.  She reports having a strong family history of premature CAD-- maternal grandfather had MI at age of 36 requiring CABG, sister was diagnosed with AML at age 30yo with reported MI after initiation of chemotherapy-- patient would like to be evaluated from a cardiac standpoint to reduce ASCVD risk.     Plan:  -Recommend obtaining an echocardiogram for assessment of mechanical and structural function.  -Lipid panel ordered per PCP-- pending results  -Discussed healthy living habits-- following Mediterranean diet as well as 30 minutes of aerobic exercise most days of the week as advised by [facility].    -Follow up with PCP and with cardiology as needed.       CELE Martines-CNP

## 2024-08-12 DIAGNOSIS — F32.0 CURRENT MILD EPISODE OF MAJOR DEPRESSIVE DISORDER, UNSPECIFIED WHETHER RECURRENT (CMS-HCC): ICD-10-CM

## 2024-08-13 RX ORDER — BUPROPION HYDROCHLORIDE 150 MG/1
150 TABLET ORAL EVERY MORNING
Qty: 30 TABLET | Refills: 1 | Status: SHIPPED | OUTPATIENT
Start: 2024-08-13 | End: 2024-10-12

## 2024-08-15 ENCOUNTER — LAB (OUTPATIENT)
Dept: LAB | Facility: LAB | Age: 29
End: 2024-08-15
Payer: COMMERCIAL

## 2024-08-15 DIAGNOSIS — D50.9 IRON DEFICIENCY ANEMIA, UNSPECIFIED IRON DEFICIENCY ANEMIA TYPE: ICD-10-CM

## 2024-08-15 DIAGNOSIS — E55.9 VITAMIN D INSUFFICIENCY: ICD-10-CM

## 2024-08-15 DIAGNOSIS — E78.5 BORDERLINE HYPERLIPIDEMIA: ICD-10-CM

## 2024-08-15 LAB
ALBUMIN SERPL BCP-MCNC: 4.4 G/DL (ref 3.4–5)
ALP SERPL-CCNC: 93 U/L (ref 33–110)
ALT SERPL W P-5'-P-CCNC: 13 U/L (ref 7–45)
ANION GAP SERPL CALC-SCNC: 12 MMOL/L (ref 10–20)
AST SERPL W P-5'-P-CCNC: 13 U/L (ref 9–39)
BILIRUB SERPL-MCNC: 0.5 MG/DL (ref 0–1.2)
BUN SERPL-MCNC: 17 MG/DL (ref 6–23)
CALCIUM SERPL-MCNC: 8.9 MG/DL (ref 8.6–10.3)
CHLORIDE SERPL-SCNC: 107 MMOL/L (ref 98–107)
CHOLEST SERPL-MCNC: 145 MG/DL (ref 0–199)
CHOLESTEROL/HDL RATIO: 4
CO2 SERPL-SCNC: 27 MMOL/L (ref 21–32)
CREAT SERPL-MCNC: 0.82 MG/DL (ref 0.5–1.05)
EGFRCR SERPLBLD CKD-EPI 2021: >90 ML/MIN/1.73M*2
ERYTHROCYTE [DISTWIDTH] IN BLOOD BY AUTOMATED COUNT: 13.6 % (ref 11.5–14.5)
GLUCOSE SERPL-MCNC: 95 MG/DL (ref 74–99)
HCT VFR BLD AUTO: 38 % (ref 36–46)
HDLC SERPL-MCNC: 36.7 MG/DL
HGB BLD-MCNC: 12 G/DL (ref 12–16)
IRON SATN MFR SERPL: 12 % (ref 25–45)
IRON SERPL-MCNC: 41 UG/DL (ref 35–150)
LDLC SERPL CALC-MCNC: 94 MG/DL
MCH RBC QN AUTO: 26.9 PG (ref 26–34)
MCHC RBC AUTO-ENTMCNC: 31.6 G/DL (ref 32–36)
MCV RBC AUTO: 85 FL (ref 80–100)
NON HDL CHOLESTEROL: 108 MG/DL (ref 0–149)
NRBC BLD-RTO: 0 /100 WBCS (ref 0–0)
PLATELET # BLD AUTO: 262 X10*3/UL (ref 150–450)
POTASSIUM SERPL-SCNC: 4.3 MMOL/L (ref 3.5–5.3)
PROT SERPL-MCNC: 6.6 G/DL (ref 6.4–8.2)
RBC # BLD AUTO: 4.46 X10*6/UL (ref 4–5.2)
SODIUM SERPL-SCNC: 142 MMOL/L (ref 136–145)
TIBC SERPL-MCNC: 347 UG/DL (ref 240–445)
TRIGL SERPL-MCNC: 72 MG/DL (ref 0–149)
TSH SERPL-ACNC: 1 MIU/L (ref 0.44–3.98)
UIBC SERPL-MCNC: 306 UG/DL (ref 110–370)
VLDL: 14 MG/DL (ref 0–40)
WBC # BLD AUTO: 7.8 X10*3/UL (ref 4.4–11.3)

## 2024-08-15 PROCEDURE — 84443 ASSAY THYROID STIM HORMONE: CPT

## 2024-08-15 PROCEDURE — 80053 COMPREHEN METABOLIC PANEL: CPT

## 2024-08-15 PROCEDURE — 85027 COMPLETE CBC AUTOMATED: CPT

## 2024-08-15 PROCEDURE — 83540 ASSAY OF IRON: CPT

## 2024-08-15 PROCEDURE — 83550 IRON BINDING TEST: CPT

## 2024-08-15 PROCEDURE — 36415 COLL VENOUS BLD VENIPUNCTURE: CPT

## 2024-08-15 PROCEDURE — 80061 LIPID PANEL: CPT

## 2024-08-15 PROCEDURE — 82306 VITAMIN D 25 HYDROXY: CPT

## 2024-08-16 DIAGNOSIS — E55.9 VITAMIN D DEFICIENCY: Primary | ICD-10-CM

## 2024-08-16 LAB — 25(OH)D3 SERPL-MCNC: 22 NG/ML (ref 30–100)

## 2024-08-16 RX ORDER — ERGOCALCIFEROL 1.25 MG/1
50000 CAPSULE ORAL
Qty: 12 CAPSULE | Refills: 1 | Status: SHIPPED | OUTPATIENT
Start: 2024-08-18

## 2024-08-23 ENCOUNTER — APPOINTMENT (OUTPATIENT)
Dept: CARDIOLOGY | Facility: HOSPITAL | Age: 29
End: 2024-08-23
Payer: COMMERCIAL

## 2024-08-27 ENCOUNTER — APPOINTMENT (OUTPATIENT)
Dept: PRIMARY CARE | Facility: CLINIC | Age: 29
End: 2024-08-27
Payer: COMMERCIAL

## 2024-08-27 VITALS
DIASTOLIC BLOOD PRESSURE: 76 MMHG | OXYGEN SATURATION: 98 % | BODY MASS INDEX: 38.23 KG/M2 | TEMPERATURE: 98.2 F | WEIGHT: 209 LBS | HEART RATE: 74 BPM | SYSTOLIC BLOOD PRESSURE: 120 MMHG

## 2024-08-27 DIAGNOSIS — F32.0 CURRENT MILD EPISODE OF MAJOR DEPRESSIVE DISORDER, UNSPECIFIED WHETHER RECURRENT (CMS-HCC): Primary | ICD-10-CM

## 2024-08-27 DIAGNOSIS — F32.0 CURRENT MILD EPISODE OF MAJOR DEPRESSIVE DISORDER, UNSPECIFIED WHETHER RECURRENT (CMS-HCC): ICD-10-CM

## 2024-08-27 DIAGNOSIS — F41.9 ANXIETY: ICD-10-CM

## 2024-08-27 PROCEDURE — 99213 OFFICE O/P EST LOW 20 MIN: CPT | Performed by: PHYSICIAN ASSISTANT

## 2024-08-27 PROCEDURE — 3078F DIAST BP <80 MM HG: CPT | Performed by: PHYSICIAN ASSISTANT

## 2024-08-27 PROCEDURE — 3074F SYST BP LT 130 MM HG: CPT | Performed by: PHYSICIAN ASSISTANT

## 2024-08-27 PROCEDURE — 1036F TOBACCO NON-USER: CPT | Performed by: PHYSICIAN ASSISTANT

## 2024-08-27 RX ORDER — BUPROPION HYDROCHLORIDE 150 MG/1
150 TABLET ORAL EVERY MORNING
Qty: 90 TABLET | Refills: 3 | Status: SHIPPED | OUTPATIENT
Start: 2024-08-27 | End: 2025-08-27

## 2024-08-27 NOTE — PROGRESS NOTES
Subjective     HPI   Alison Smith is a 29 y.o. year old female patient with presenting to clinic with concern for   Chief Complaint   Patient presents with    6 weeks     Med check - feeling more energized        6 week med check- Started on wellbutrin 150mg last visit    GYN- [facility] women's group. Gave birth 12/23/23        Family  history of Heart disease- Sister- age 31 MI. (Possible complications of leukemia?).   Episodes of dizziness/presyncope. Intermittent palpitations. Episodes of feeling of heart racing. Possibly circumstantial related- dehydration, caffeine, but concern that her cardiology visit and echo may not be covered properly by her insurance.       Was on lexapro for postpartum depression- was on for 2.5months felt flat and still sad. Denies suicidal ideation  Had been on prozac? In the past and didn't feel her best either.  Doing much better on wellbutrin    Getting  next winter.    Patient Active Problem List   Diagnosis    Generalized anxiety disorder    Eczema    Id reaction    Bilateral carpal tunnel syndrome    Lactose intolerance    Multiple food allergies    Penicillin allergy    Gestational hypertension, third trimester ([facility])       Past Medical History:   Diagnosis Date    Acute maxillary sinusitis, unspecified 10/28/2020    Acute maxillary sinusitis, unspecified    Adjustment disorder with mixed anxiety and depressed mood 03/15/2017    Adjustment disorder with mixed anxiety and depressed mood    Allergic     Anxiety     Calculus of bile duct without cholangitis or cholecystitis without obstruction     Gall bladder pain    Contact with and (suspected) exposure to other viral communicable diseases 01/04/2022    Contact with or exposure to viral disease    Depression     Eczema     Exercise-induced asthma ([facility])     Fatigue, unspecified type     Generalized anxiety disorder     HPV (human papilloma virus) infection     Nausea and vomiting in pregnancy ([facility])      Noninfective gastroenteritis and colitis, unspecified 08/23/2022    Gastroenteritis, acute    Personal history of other specified conditions 03/15/2017    History of left flank pain    Varicella       Past Surgical History:   Procedure Laterality Date    NASAL ENDOSCOPY      OTHER SURGICAL HISTORY  10/22/2019    No history of surgery      Family History   Problem Relation Name Age of Onset    Drug abuse Mother Ashley Smith     No Known Problems Father      Hypertension Sister Graciela Smith     Leukemia Sister Graciela Smith     Heart attack Sister Graciela Smith     Cancer Sister Graciela Smith     Cancer Maternal Grandmother Luci [facility]     Diabetes Mother's Brother Vu [facility]       Social History     Tobacco Use    Smoking status: Never    Smokeless tobacco: Never   Substance Use Topics    Alcohol use: Not Currently     Comment: Socially        Current Outpatient Medications:     buPROPion XL (Wellbutrin XL) 150 mg 24 hr tablet, Take 1 tablet (150 mg) by mouth once daily in the morning. Do not crush, chew, or split., Disp: 30 tablet, Rfl: 1    busPIRone (Buspar) 5 mg tablet, Take 1 tablet (5 mg) by mouth 3 times a day as needed (anxiety)., Disp: 90 tablet, Rfl: 11    ergocalciferol (Vitamin D-2) 1.25 MG (37984 UT) capsule, Take 1 capsule (50,000 Units) by mouth 1 (one) time per week. For the next 6 months after than please take over the counter 1000 units daily, Disp: 12 capsule, Rfl: 1    ferrous sulfate, 325 mg ferrous sulfate, tablet, Take 1 tablet (325 mg) by mouth 2 times a week., Disp: , Rfl:     PNV no.163-iron-folate no.10 20 mg iron- 1 mg tablet, Take by mouth., Disp: , Rfl:      Review of Systems  Constitutional: Denies fever  HEENT: Denies ST, earache  CVS: Denies Chest pain  Pulmonary: Denies wheezing, SOB  GI: Denies N/V  : Denies dysuria  Musculoskeletal:  Denies myalgia  Neuro: Denies focal weakness or numbness.  Skin: Denies Rashes.  *Review of Systems is negative unless otherwise  mentioned in HPI or ROS above.    Objective   /76   Pulse 74   Temp 36.8 °C (98.2 °F)   Wt 94.8 kg (209 lb)   SpO2 98%   BMI 38.23 kg/m²  reviewed Body mass index is 38.23 kg/m².     Physical Exam  Constitutional: NAD.  Resting comfortably.  Head: Atraumatic, normocephalic.  ENT: Moist oral mucosa. Nasal mucosa wnl.   Cardiac: Regular rate & rhythm.   Pulmonary: Lungs clear bilat  GI: Soft, Nontender, nondistended.   Musculoskeletal: No peripheral edema.   Skin: No evidence of trauma. No rashes  Psych: Intact judgement and insight.    .Assessment/Plan   Problem List Items Addressed This Visit    None  Visit Diagnoses         Codes    Current mild episode of major depressive disorder, unspecified whether recurrent ([facility])    -  Primary F32.0    Anxiety     F41.9

## 2024-11-26 ENCOUNTER — TELEPHONE (OUTPATIENT)
Dept: PRIMARY CARE | Facility: CLINIC | Age: 29
End: 2024-11-26
Payer: COMMERCIAL

## 2024-11-26 NOTE — TELEPHONE ENCOUNTER
She is experiencing sinus pressure.  She has green mucus.  And her teeth and jaw are beginning to hurt due to the pressure.  She would like to be seen if possible or let her know what you recommend.

## 2025-02-26 ENCOUNTER — OFFICE VISIT (OUTPATIENT)
Dept: URGENT CARE | Facility: URGENT CARE | Age: 30
End: 2025-02-26
Payer: COMMERCIAL

## 2025-02-26 VITALS
WEIGHT: 173.72 LBS | HEART RATE: 110 BPM | TEMPERATURE: 100.3 F | SYSTOLIC BLOOD PRESSURE: 122 MMHG | DIASTOLIC BLOOD PRESSURE: 87 MMHG | RESPIRATION RATE: 20 BRPM | OXYGEN SATURATION: 96 % | BODY MASS INDEX: 31.77 KG/M2

## 2025-02-26 DIAGNOSIS — Z91.89 AT INCREASED RISK OF EXPOSURE TO COVID-19 VIRUS: ICD-10-CM

## 2025-02-26 DIAGNOSIS — J10.1 INFLUENZA A: ICD-10-CM

## 2025-02-26 DIAGNOSIS — Z20.828 EXPOSURE TO THE FLU: Primary | ICD-10-CM

## 2025-02-26 LAB
POC BINAX EXPIRATION: NEGATIVE
POC BINAX NOW COVID SERIAL NUMBER: NEGATIVE
POC RAPID INFLUENZA A: POSITIVE
POC RAPID INFLUENZA B: NEGATIVE
POC RAPID STREP: NEGATIVE
POC SARS-COV-2 AG BINAX: NORMAL

## 2025-02-26 PROCEDURE — 99214 OFFICE O/P EST MOD 30 MIN: CPT | Performed by: FAMILY MEDICINE

## 2025-02-26 PROCEDURE — 3079F DIAST BP 80-89 MM HG: CPT | Performed by: FAMILY MEDICINE

## 2025-02-26 PROCEDURE — 87804 INFLUENZA ASSAY W/OPTIC: CPT | Performed by: FAMILY MEDICINE

## 2025-02-26 PROCEDURE — 87880 STREP A ASSAY W/OPTIC: CPT | Performed by: FAMILY MEDICINE

## 2025-02-26 PROCEDURE — 3074F SYST BP LT 130 MM HG: CPT | Performed by: FAMILY MEDICINE

## 2025-02-26 PROCEDURE — 1036F TOBACCO NON-USER: CPT | Performed by: FAMILY MEDICINE

## 2025-02-26 PROCEDURE — 87811 SARS-COV-2 COVID19 W/OPTIC: CPT | Performed by: FAMILY MEDICINE

## 2025-02-26 RX ORDER — ONDANSETRON 4 MG/1
4 TABLET, ORALLY DISINTEGRATING ORAL EVERY 8 HOURS PRN
Qty: 20 TABLET | Refills: 0 | Status: SHIPPED | OUTPATIENT
Start: 2025-02-26 | End: 2025-03-05

## 2025-02-26 RX ORDER — OSELTAMIVIR PHOSPHATE 75 MG/1
75 CAPSULE ORAL EVERY 12 HOURS
Qty: 10 CAPSULE | Refills: 0 | Status: SHIPPED | OUTPATIENT
Start: 2025-02-26 | End: 2025-03-03

## 2025-02-26 ASSESSMENT — ENCOUNTER SYMPTOMS
OCCASIONAL FEELINGS OF UNSTEADINESS: 0
DEPRESSION: 0
LOSS OF SENSATION IN FEET: 0

## 2025-02-26 ASSESSMENT — PAIN SCALES - GENERAL: PAINLEVEL_OUTOF10: 2

## 2025-02-26 NOTE — PROGRESS NOTES
Subjective   Patient ID: Alison Smith is a 29 y.o. female. They present today with a chief complaint of Other (Pt. C/O fever, HA, chest congestion, sore throat, cough-mucus-clear, and back pain./Started 2 days ago. /Tylenol at 4 PM. ).    History of Present Illness  HPI  Last few days fever 100-102  ST and chest hurts when she coughs  Coughing a lot, pos sputum at times  No wheezing or sob  Mild earache left of and on  Pos HA and myalgias  Pos nausea, emesis x 2 today, diarrhea x 3-4 today  Drinking fluids today 2 bottles of water, gags her but keeps down, UOP ok  Past Medical History  Allergies as of 02/26/2025 - Reviewed 02/26/2025   Allergen Reaction Noted    Amoxicillin Hives 02/25/2023    Penicillin Hives 09/26/2023     /87 (BP Location: Left arm, Patient Position: Sitting, BP Cuff Size: Large adult)   Pulse 110   Temp (!) 37.9 °C (100.3 °F) (Oral)   Resp 20   Wt 78.8 kg (173 lb 11.6 oz)   LMP 02/15/2025 (Exact Date)   SpO2 96%   BMI 31.77 kg/m²     PE:  Genl: Alert, oriented, mildly ill but non-toxic appearing in no acute distress. Speaks in full sentences.  HEENT: PEERL, EOMI, no conjunctival injection, eyelids normal w/o swelling or erythema.  Minimal rhinorrhea and nasal congestion.  Mucous membranes moist.  No oral lesions. No pharyngeal erythema or exudates. TMs normal without erythema or effusions.  Lungs: clear to auscultation bilaterally. No rhonchi. No wheezes. No crackles. Good air exchange.  Heart: regular rate and rhythm. No murmurs.  Abdomen: soft, non-tender,  Encounter Diagnoses   Name Primary?    Exposure to the flu Yes    At increased risk of exposure to COVID-19 virus     Influenza A    Tamiflu as directed  Zofran prn nausea  Declines Tessalon  Fu prn

## 2025-02-26 NOTE — PROGRESS NOTES
Subjective     HPI   Alison Smith is a 29 y.o. year old female patient with presenting to clinic with concern for   Chief Complaint   Patient presents with    Follow-up     6 months - went to urgent care yesterday        Diagnosed with influenza A yesterday. Nausea. Coughing, fatigue, malaise. Feeling dehydrated.   Chest is burning, sore throat.    Did poorly on wellbutrin, Was doing well for a while, but then had headaches for a month followed by suicidal ideation- stopped wellbutrin immediately and this resolved. No suicidal thoughts or intent. No passive thoughts of suicide.     BP Readings from Last 5 Encounters:   02/27/25 102/60   02/26/25 122/87   08/27/24 120/76   08/08/24 118/72   07/12/24 112/68     GYN- [facility] women[facility] group. Gave birth 12/23/23     VICTORIA 7= 9  PH9-9 =9     Family  history of Heart disease- Sister- at age 31 MI. (Possible complications of leukemia)     Anxiety & Depression   -lexapro  -buspar   Denies suicidal ideation     Patient Active Problem List   Diagnosis    Generalized anxiety disorder    Eczema    Id reaction    Bilateral carpal tunnel syndrome    Lactose intolerance    Multiple food allergies    Penicillin allergy    Gestational hypertension, third trimester ([facility])       Past Medical History:   Diagnosis Date    Acute maxillary sinusitis, unspecified 10/28/2020    Acute maxillary sinusitis, unspecified    Adjustment disorder with mixed anxiety and depressed mood 03/15/2017    Adjustment disorder with mixed anxiety and depressed mood    Allergic     Anxiety     Calculus of bile duct without cholangitis or cholecystitis without obstruction     Gall bladder pain    Contact with and (suspected) exposure to other viral communicable diseases 01/04/2022    Contact with or exposure to viral disease    Depression     Eczema     Exercise-induced asthma ([facility])     Fatigue, unspecified type     Generalized anxiety disorder     HPV (human papilloma virus) infection     Nausea and  vomiting in pregnancy     Noninfective gastroenteritis and colitis, unspecified 08/23/2022    Gastroenteritis, acute    Personal history of other specified conditions 03/15/2017    History of left flank pain    Varicella       Past Surgical History:   Procedure Laterality Date    NASAL ENDOSCOPY      OTHER SURGICAL HISTORY  10/22/2019    No history of surgery      Family History   Problem Relation Name Age of Onset    Drug abuse Mother Ashley Smith     No Known Problems Father      Hypertension Sister Graciela Smith     Leukemia Sister Graciela Smith     Heart attack Sister Graciela Smith     Cancer Sister Graciela Smith     Cancer Maternal Grandmother Luci [facility]     Diabetes Mother's Brother Vu [facility]       Social History     Tobacco Use    Smoking status: Never    Smokeless tobacco: Never   Substance Use Topics    Alcohol use: Not Currently     Comment: Socially        Current Outpatient Medications:     busPIRone (Buspar) 5 mg tablet, Take 1 tablet (5 mg) by mouth 3 times a day as needed (anxiety)., Disp: 90 tablet, Rfl: 11    ergocalciferol (Vitamin D-2) 1.25 MG (14973 UT) capsule, Take 1 capsule (50,000 Units) by mouth 1 (one) time per week. For the next 6 months after than please take over the counter 1000 units daily, Disp: 12 capsule, Rfl: 1    ondansetron ODT (Zofran-ODT) 4 mg disintegrating tablet, Dissolve 1 tablet (4 mg) in the mouth every 8 hours if needed for nausea or vomiting for up to 7 days., Disp: 20 tablet, Rfl: 0    oseltamivir (Tamiflu) 75 mg capsule, Take 1 capsule (75 mg) by mouth every 12 hours for 5 days., Disp: 10 capsule, Rfl: 0    benzonatate (Tessalon) 200 mg capsule, Take 1 capsule (200 mg) by mouth 3 times a day as needed for cough. Do not crush or chew., Disp: 42 capsule, Rfl: 0    ferrous sulfate, 325 mg ferrous sulfate, tablet, Take 1 tablet (325 mg) by mouth 2 times a week. (Patient not taking: Reported on 2/27/2025), Disp: , Rfl:     PNV no.163-iron-folate no.10 20 mg  "iron- 1 mg tablet, Take by mouth. (Patient not taking: Reported on 2/27/2025), Disp: , Rfl:      Review of Systems  Constitutional: Denies fever  HEENT: Denies ST, earache  CVS: Denies Chest pain  Pulmonary: Denies wheezing, SOB  GI: Denies N/V  : Denies dysuria  Musculoskeletal:  Denies myalgia  Neuro: Denies focal weakness or numbness.  Skin: Denies Rashes.  *Review of Systems is negative unless otherwise mentioned in HPI or ROS above.    Objective   /60   Pulse 86   Temp 36.4 °C (97.6 °F)   Ht 1.575 m (5' 2\")   Wt 87.5 kg (193 lb)   LMP 02/15/2025 (Exact Date)   SpO2 98%   BMI 35.30 kg/m²  reviewed Body mass index is 35.3 kg/m².     Physical Exam  Constitutional: NAD.  Resting comfortably.  Head: Atraumatic, normocephalic.  ENT: Moist oral mucosa. Nasal mucosa wnl.   Cardiac: Regular rate & rhythm.   Pulmonary: Lungs clear bilat  GI: Soft, Nontender, nondistended.   Musculoskeletal: No peripheral edema.   Skin: No evidence of trauma. No rashes  Psych: Intact judgement and insight.    .Assessment/Plan   Problem List Items Addressed This Visit    None  Visit Diagnoses         Codes    Influenza A    -  Primary J10.1    Relevant Medications    benzonatate (Tessalon) 200 mg capsule    Borderline hyperlipidemia     E78.5    Relevant Orders    CBC    Comprehensive Metabolic Panel    TSH with reflex to Free T4 if abnormal    Lipid Panel    Vitamin D insufficiency     E55.9    Relevant Orders    Vitamin D 25-Hydroxy,Total (for eval of Vitamin D levels)            "

## 2025-02-27 ENCOUNTER — APPOINTMENT (OUTPATIENT)
Dept: PRIMARY CARE | Facility: CLINIC | Age: 30
End: 2025-02-27
Payer: COMMERCIAL

## 2025-02-27 VITALS
BODY MASS INDEX: 35.51 KG/M2 | WEIGHT: 193 LBS | TEMPERATURE: 97.6 F | HEART RATE: 86 BPM | OXYGEN SATURATION: 98 % | DIASTOLIC BLOOD PRESSURE: 60 MMHG | HEIGHT: 62 IN | SYSTOLIC BLOOD PRESSURE: 102 MMHG

## 2025-02-27 DIAGNOSIS — E78.5 BORDERLINE HYPERLIPIDEMIA: ICD-10-CM

## 2025-02-27 DIAGNOSIS — E55.9 VITAMIN D INSUFFICIENCY: ICD-10-CM

## 2025-02-27 DIAGNOSIS — J10.1 INFLUENZA A: Primary | ICD-10-CM

## 2025-02-27 PROCEDURE — 3008F BODY MASS INDEX DOCD: CPT | Performed by: PHYSICIAN ASSISTANT

## 2025-02-27 PROCEDURE — 3078F DIAST BP <80 MM HG: CPT | Performed by: PHYSICIAN ASSISTANT

## 2025-02-27 PROCEDURE — 3074F SYST BP LT 130 MM HG: CPT | Performed by: PHYSICIAN ASSISTANT

## 2025-02-27 PROCEDURE — 1036F TOBACCO NON-USER: CPT | Performed by: PHYSICIAN ASSISTANT

## 2025-02-27 PROCEDURE — 99214 OFFICE O/P EST MOD 30 MIN: CPT | Performed by: PHYSICIAN ASSISTANT

## 2025-02-27 RX ORDER — BENZONATATE 200 MG/1
200 CAPSULE ORAL 3 TIMES DAILY PRN
Qty: 42 CAPSULE | Refills: 0 | Status: SHIPPED | OUTPATIENT
Start: 2025-02-27 | End: 2025-03-29

## 2025-02-27 ASSESSMENT — PATIENT HEALTH QUESTIONNAIRE - PHQ9
2. FEELING DOWN, DEPRESSED OR HOPELESS: SEVERAL DAYS
1. LITTLE INTEREST OR PLEASURE IN DOING THINGS: SEVERAL DAYS
10. IF YOU CHECKED OFF ANY PROBLEMS, HOW DIFFICULT HAVE THESE PROBLEMS MADE IT FOR YOU TO DO YOUR WORK, TAKE CARE OF THINGS AT HOME, OR GET ALONG WITH OTHER PEOPLE: NOT DIFFICULT AT ALL
SUM OF ALL RESPONSES TO PHQ9 QUESTIONS 1 AND 2: 2

## 2025-04-19 LAB
25(OH)D3+25(OH)D2 SERPL-MCNC: 29 NG/ML (ref 30–100)
ALBUMIN SERPL-MCNC: 4.5 G/DL (ref 3.6–5.1)
ALP SERPL-CCNC: 86 U/L (ref 31–125)
ALT SERPL-CCNC: 13 U/L (ref 6–29)
ANION GAP SERPL CALCULATED.4IONS-SCNC: 9 MMOL/L (CALC) (ref 7–17)
AST SERPL-CCNC: 12 U/L (ref 10–30)
BILIRUB SERPL-MCNC: 0.6 MG/DL (ref 0.2–1.2)
BUN SERPL-MCNC: 14 MG/DL (ref 7–25)
CALCIUM SERPL-MCNC: 8.8 MG/DL (ref 8.6–10.2)
CHLORIDE SERPL-SCNC: 107 MMOL/L (ref 98–110)
CHOLEST SERPL-MCNC: 135 MG/DL
CHOLEST/HDLC SERPL: 3.5 (CALC)
CO2 SERPL-SCNC: 25 MMOL/L (ref 20–32)
CREAT SERPL-MCNC: 0.68 MG/DL (ref 0.5–0.97)
EGFRCR SERPLBLD CKD-EPI 2021: 120 ML/MIN/1.73M2
ERYTHROCYTE [DISTWIDTH] IN BLOOD BY AUTOMATED COUNT: 13.6 % (ref 11–15)
GLUCOSE SERPL-MCNC: 96 MG/DL (ref 65–99)
HCT VFR BLD AUTO: 39.1 % (ref 35–45)
HDLC SERPL-MCNC: 39 MG/DL
HGB BLD-MCNC: 12.7 G/DL (ref 11.7–15.5)
LDLC SERPL CALC-MCNC: 84 MG/DL (CALC)
MCH RBC QN AUTO: 28.2 PG (ref 27–33)
MCHC RBC AUTO-ENTMCNC: 32.5 G/DL (ref 32–36)
MCV RBC AUTO: 86.7 FL (ref 80–100)
NONHDLC SERPL-MCNC: 96 MG/DL (CALC)
PLATELET # BLD AUTO: 268 THOUSAND/UL (ref 140–400)
PMV BLD REES-ECKER: 9.9 FL (ref 7.5–12.5)
POTASSIUM SERPL-SCNC: 4.3 MMOL/L (ref 3.5–5.3)
PROT SERPL-MCNC: 7.3 G/DL (ref 6.1–8.1)
RBC # BLD AUTO: 4.51 MILLION/UL (ref 3.8–5.1)
SODIUM SERPL-SCNC: 141 MMOL/L (ref 135–146)
TRIGL SERPL-MCNC: 50 MG/DL
TSH SERPL-ACNC: 1.34 MIU/L
WBC # BLD AUTO: 6.7 THOUSAND/UL (ref 3.8–10.8)

## 2025-05-01 ENCOUNTER — APPOINTMENT (OUTPATIENT)
Dept: OBSTETRICS AND GYNECOLOGY | Facility: CLINIC | Age: 30
End: 2025-05-01
Payer: COMMERCIAL

## 2025-05-01 VITALS — BODY MASS INDEX: 35.85 KG/M2 | WEIGHT: 196 LBS | SYSTOLIC BLOOD PRESSURE: 114 MMHG | DIASTOLIC BLOOD PRESSURE: 74 MMHG

## 2025-05-01 DIAGNOSIS — B97.7 HIGH RISK HUMAN PAPILLOMA VIRUS (HPV) INFECTION OF CERVIX: ICD-10-CM

## 2025-05-01 DIAGNOSIS — N72 HIGH RISK HUMAN PAPILLOMA VIRUS (HPV) INFECTION OF CERVIX: ICD-10-CM

## 2025-05-01 DIAGNOSIS — Z01.419 ENCOUNTER FOR WELL WOMAN EXAM WITH ROUTINE GYNECOLOGICAL EXAM: Primary | ICD-10-CM

## 2025-05-01 PROCEDURE — 99395 PREV VISIT EST AGE 18-39: CPT | Performed by: OBSTETRICS & GYNECOLOGY

## 2025-05-01 PROCEDURE — 1036F TOBACCO NON-USER: CPT | Performed by: OBSTETRICS & GYNECOLOGY

## 2025-05-01 PROCEDURE — 3074F SYST BP LT 130 MM HG: CPT | Performed by: OBSTETRICS & GYNECOLOGY

## 2025-05-01 PROCEDURE — 87626 HPV SEP HI-RSK TYP&POOL RSLT: CPT

## 2025-05-01 PROCEDURE — 3078F DIAST BP <80 MM HG: CPT | Performed by: OBSTETRICS & GYNECOLOGY

## 2025-05-01 NOTE — PROGRESS NOTES
ASSESSMENT/PLAN    1. Encounter for well woman exam with routine gynecological exam (Primary)  Prior HPV positive.   Routine well woman visit.   Your exam was normal today.   A pap and HPV test was done. If you are signed onto the  MY CHART, you will be notified about your pap results through the MY CHART in 2-3 weeks.        ----------------------------------------------------------------------------------    SUBJECTIVE      PAP 2024 NEG, HPV POS OTHER  COLPO 3- NEG  PAP: 3- NEG, HPV POS other  GARDASIL x 3          HPI    28 yo  presents for routine well woman visit.   Last visit 3- for Colpo. Prior pap  and  HPV+ other, nl colpo.  Delivered 2023. Son is 16 months old. Getting  in Sept.   Denies any intervening medical or surgical issues.   Cycles are monthly, regular. Condoms for contraception. In past side effects from birth control pills.  Denies family history of female cancers.   Single, nonsmoker, ETOH    REVIEW OF SYSTEMS    Constitutional: no recent weight gain, no fatigue.   ENT: no hearing loss.  Cardiovascular: no chest pain, no palpitations.  Respiratory: no shortness of breath.  Gastrointestinal: no abdominal pain, no constipation, no nausea, no diarrhea.  Musculoskeletal: no back pain.  Lymphatic: no swollen glands.  Genitourinary: no pelvic pain, no urinary urgency, no urinary incontinence, no change in urinary frequency, no vaginal dryness, no vaginal itching,  no vaginal discharge, no unexplained vaginal bleeding, no lesion/sore.   Breasts: no breast pain, no nipple discharge, no breast lump.   Neurological: no headache, no numbness, no dizziness.   Psychiatric: + sleep disturbances, + anxiety, + depression.   Endocrine: no hot flashes, no loss of hair, no hirsutism.       OBJECTIVE    /74   Wt 88.9 kg (196 lb)   LMP 2025   BMI 35.85 kg/m²      Physical Exam     Constitutional: Alert and in no acute distress. Well developed, well  nourished   Head and Face: Head and face: normal   Eyes: Normal external exam - nonicteric sclera.  Ears, Nose, Mouth, and Throat: External inspection of ears and nose: normal   Neck: no neck asymmetry. Supple and thyroid not enlarged and there were no palpable thyroid nodules   Cardiovascular: Heart rate and rhythm were normal  Pulmonary: No respiratory distress and clear bilateral breath sounds   Chest: Breasts: normal appearance, no nipple discharge, no skin changes. Palpation of breasts and axillae: no palpable mass, no axillary lymphadenopathy   Abdomen: soft nontender; no abdominal mass palpated, no organomegaly and no hernias   Genitourinary: external genitalia: normal appearing vulva and labia without lesions. No inguinal lymphadenopathy,    Urethra: normal.   Bladder: normal on palpation.   Perianal area: normal   Vagina: normal, without significant discharge, no lesions.  Cervix: Normal appearing without lesions.  Uterus: Normal, mobile, nontender.  Right and left adnexa/parametria: Normal  Skin: normal skin color and pigmentation, normal skin turgor, and no rash  Psychiatric: alert and oriented x 3., affect normal to patient baseline and mood: appropriate        Steph Richards MD

## 2025-05-13 LAB
CYTOLOGY CMNT CVX/VAG CYTO-IMP: NORMAL
HPV HR 12 DNA GENITAL QL NAA+PROBE: NEGATIVE
HPV HR GENOTYPES PNL CVX NAA+PROBE: NEGATIVE
HPV16 DNA SPEC QL NAA+PROBE: NEGATIVE
HPV18 DNA SPEC QL NAA+PROBE: NEGATIVE
LAB AP HPV GENOTYPE QUESTION: YES
LAB AP HPV HR: NORMAL
LAB AP PREVIOUS ABNORMAL HISTORY: NORMAL
LABORATORY COMMENT REPORT: NORMAL
LMP START DATE: NORMAL
PATH REPORT.TOTAL CANCER: NORMAL